# Patient Record
Sex: FEMALE | Race: WHITE | NOT HISPANIC OR LATINO
[De-identification: names, ages, dates, MRNs, and addresses within clinical notes are randomized per-mention and may not be internally consistent; named-entity substitution may affect disease eponyms.]

---

## 2018-03-28 ENCOUNTER — APPOINTMENT (OUTPATIENT)
Dept: OBGYN | Facility: CLINIC | Age: 23
End: 2018-03-28
Payer: COMMERCIAL

## 2018-03-28 VITALS
BODY MASS INDEX: 25.52 KG/M2 | HEIGHT: 60 IN | SYSTOLIC BLOOD PRESSURE: 125 MMHG | WEIGHT: 130 LBS | DIASTOLIC BLOOD PRESSURE: 83 MMHG

## 2018-03-28 DIAGNOSIS — Z86.69 PERSONAL HISTORY OF OTHER DISEASES OF THE NERVOUS SYSTEM AND SENSE ORGANS: ICD-10-CM

## 2018-03-28 DIAGNOSIS — Z87.39 PERSONAL HISTORY OF OTHER DISEASES OF THE MUSCULOSKELETAL SYSTEM AND CONNECTIVE TISSUE: ICD-10-CM

## 2018-03-28 PROCEDURE — 99385 PREV VISIT NEW AGE 18-39: CPT

## 2018-03-28 RX ORDER — TOPIRAMATE 50 MG/1
TABLET, COATED ORAL
Refills: 0 | Status: ACTIVE | COMMUNITY

## 2018-03-30 LAB
C TRACH RRNA SPEC QL NAA+PROBE: NOT DETECTED
N GONORRHOEA RRNA SPEC QL NAA+PROBE: NOT DETECTED
SOURCE TP AMPLIFICATION: NORMAL

## 2018-04-03 LAB — CYTOLOGY CVX/VAG DOC THIN PREP: NORMAL

## 2018-11-09 ENCOUNTER — MEDICATION RENEWAL (OUTPATIENT)
Age: 23
End: 2018-11-09

## 2018-11-11 RX ORDER — NORETHINDRONE AND ETHINYL ESTRADIOL TABLETS 0.4-0.035
0.4-35 KIT ORAL
Qty: 84 | Refills: 3 | Status: ACTIVE | COMMUNITY
Start: 2018-03-28

## 2019-05-08 ENCOUNTER — APPOINTMENT (OUTPATIENT)
Dept: OBGYN | Facility: CLINIC | Age: 24
End: 2019-05-08
Payer: COMMERCIAL

## 2019-05-08 VITALS
DIASTOLIC BLOOD PRESSURE: 74 MMHG | WEIGHT: 130 LBS | HEIGHT: 60 IN | SYSTOLIC BLOOD PRESSURE: 113 MMHG | BODY MASS INDEX: 25.52 KG/M2

## 2019-05-08 PROCEDURE — 99395 PREV VISIT EST AGE 18-39: CPT

## 2019-05-08 NOTE — HISTORY OF PRESENT ILLNESS
[1 Year Ago] : 1 year ago [Regular Exercise] : regular exercise [Good] : being in good health [Healthy Diet] : a healthy diet [Menstrual Problems] : reports normal menses [Weight Concerns] : no concerns with her weight [Up to Date] : up to date with ~his/her~ STD screening [Sexually Active] : is sexually active

## 2019-05-08 NOTE — PHYSICAL EXAM
[Awake] : awake [Acute Distress] : no acute distress [Alert] : alert [Mass] : no breast mass [Nipple Discharge] : no nipple discharge [Axillary LAD] : no axillary lymphadenopathy [Tender] : non tender [Soft] : soft [Oriented x3] : oriented to person, place, and time [No Bleeding] : there was no active vaginal bleeding [Normal] : uterus [Uterine Adnexae] : were not tender and not enlarged

## 2019-07-18 ENCOUNTER — OTHER (OUTPATIENT)
Age: 24
End: 2019-07-18

## 2019-09-16 ENCOUNTER — RX RENEWAL (OUTPATIENT)
Age: 24
End: 2019-09-16

## 2019-11-18 ENCOUNTER — RX RENEWAL (OUTPATIENT)
Age: 24
End: 2019-11-18

## 2019-11-21 ENCOUNTER — MEDICATION RENEWAL (OUTPATIENT)
Age: 24
End: 2019-11-21

## 2019-11-25 ENCOUNTER — MEDICATION RENEWAL (OUTPATIENT)
Age: 24
End: 2019-11-25

## 2019-11-25 RX ORDER — NORETHINDRONE AND ETHINYL ESTRADIOL TABLETS 0.4-0.035
0.4-35 KIT ORAL DAILY
Qty: 3 | Refills: 1 | Status: ACTIVE | COMMUNITY
Start: 2019-05-08

## 2019-12-05 ENCOUNTER — APPOINTMENT (OUTPATIENT)
Dept: OBGYN | Facility: CLINIC | Age: 24
End: 2019-12-05
Payer: COMMERCIAL

## 2019-12-05 VITALS
WEIGHT: 135 LBS | BODY MASS INDEX: 26.5 KG/M2 | SYSTOLIC BLOOD PRESSURE: 124 MMHG | HEIGHT: 60 IN | DIASTOLIC BLOOD PRESSURE: 72 MMHG

## 2019-12-05 DIAGNOSIS — N94.10 UNSPECIFIED DYSPAREUNIA: ICD-10-CM

## 2019-12-05 PROCEDURE — 99213 OFFICE O/P EST LOW 20 MIN: CPT

## 2019-12-22 ENCOUNTER — TRANSCRIPTION ENCOUNTER (OUTPATIENT)
Age: 24
End: 2019-12-22

## 2020-02-06 ENCOUNTER — RX RENEWAL (OUTPATIENT)
Age: 25
End: 2020-02-06

## 2020-02-06 RX ORDER — NORETHINDRONE ACETATE AND ETHINYL ESTRADIOL 1; 20 MG/1; UG/1
1-20 TABLET ORAL
Qty: 84 | Refills: 0 | Status: ACTIVE | COMMUNITY
Start: 2019-09-16 | End: 1900-01-01

## 2020-05-22 ENCOUNTER — RX RENEWAL (OUTPATIENT)
Age: 25
End: 2020-05-22

## 2020-05-22 RX ORDER — NORETHINDRONE AND ETHINYL ESTRADIOL 0.4-0.035
0.4-35 KIT ORAL
Qty: 84 | Refills: 0 | Status: ACTIVE | COMMUNITY
Start: 2019-12-05 | End: 1900-01-01

## 2020-10-20 ENCOUNTER — NON-APPOINTMENT (OUTPATIENT)
Age: 25
End: 2020-10-20

## 2020-11-05 ENCOUNTER — ASOB RESULT (OUTPATIENT)
Age: 25
End: 2020-11-05

## 2020-11-05 ENCOUNTER — APPOINTMENT (OUTPATIENT)
Dept: OBGYN | Facility: CLINIC | Age: 25
End: 2020-11-05
Payer: COMMERCIAL

## 2020-11-05 PROCEDURE — 76817 TRANSVAGINAL US OBSTETRIC: CPT

## 2020-11-05 PROCEDURE — 99072 ADDL SUPL MATRL&STAF TM PHE: CPT

## 2020-11-18 ENCOUNTER — APPOINTMENT (OUTPATIENT)
Dept: OBGYN | Facility: CLINIC | Age: 25
End: 2020-11-18
Payer: COMMERCIAL

## 2020-11-18 VITALS
HEIGHT: 60 IN | BODY MASS INDEX: 31.61 KG/M2 | SYSTOLIC BLOOD PRESSURE: 117 MMHG | WEIGHT: 161 LBS | DIASTOLIC BLOOD PRESSURE: 80 MMHG

## 2020-11-18 DIAGNOSIS — Z00.00 ENCOUNTER FOR GENERAL ADULT MEDICAL EXAMINATION W/OUT ABNORMAL FINDINGS: ICD-10-CM

## 2020-11-18 DIAGNOSIS — R11.0 NAUSEA: ICD-10-CM

## 2020-11-18 PROCEDURE — 99395 PREV VISIT EST AGE 18-39: CPT

## 2020-11-18 PROCEDURE — 36415 COLL VENOUS BLD VENIPUNCTURE: CPT

## 2020-11-18 RX ORDER — DOXYLAMINE SUCCINATE AND PYRIDOXINE HYDROCHLORIDE 10; 10 MG/1; MG/1
10-10 TABLET, DELAYED RELEASE ORAL
Qty: 28 | Refills: 4 | Status: ACTIVE | COMMUNITY
Start: 2020-11-18 | End: 1900-01-01

## 2020-11-18 NOTE — HISTORY OF PRESENT ILLNESS
[FreeTextEntry1] : pt is a 26 y/o p0 lmp 9/10 presents for annual gyn visit with +ucg at home  [Yes] : Patient has concerns regarding sex

## 2020-11-19 LAB
HIV1+2 AB SPEC QL IA.RAPID: NONREACTIVE
TSH SERPL-ACNC: 0.7 UIU/ML

## 2020-11-20 LAB
ABO + RH PNL BLD: NORMAL
BASOPHILS # BLD AUTO: 0.02 K/UL
BASOPHILS NFR BLD AUTO: 0.2 %
BLD GP AB SCN SERPL QL: NORMAL
C TRACH RRNA SPEC QL NAA+PROBE: NOT DETECTED
EOSINOPHIL # BLD AUTO: 0.1 K/UL
EOSINOPHIL NFR BLD AUTO: 0.9 %
HBV SURFACE AG SER QL: NONREACTIVE
HCT VFR BLD CALC: 44 %
HCV AB SER QL: NONREACTIVE
HCV S/CO RATIO: 0.09 S/CO
HGB A MFR BLD: 97.2 %
HGB A2 MFR BLD: 2.8 %
HGB BLD-MCNC: 14.1 G/DL
HGB FRACT BLD-IMP: NORMAL
HPV HIGH+LOW RISK DNA PNL CVX: NOT DETECTED
IMM GRANULOCYTES NFR BLD AUTO: 0.2 %
LEAD BLD-MCNC: <1 UG/DL
LYMPHOCYTES # BLD AUTO: 3.84 K/UL
LYMPHOCYTES NFR BLD AUTO: 36.4 %
MAN DIFF?: NORMAL
MCHC RBC-ENTMCNC: 28.4 PG
MCHC RBC-ENTMCNC: 32 GM/DL
MCV RBC AUTO: 88.7 FL
MEV IGG FLD QL IA: 161 AU/ML
MEV IGG+IGM SER-IMP: POSITIVE
MONOCYTES # BLD AUTO: 1.14 K/UL
MONOCYTES NFR BLD AUTO: 10.8 %
N GONORRHOEA RRNA SPEC QL NAA+PROBE: NOT DETECTED
NEUTROPHILS # BLD AUTO: 5.43 K/UL
NEUTROPHILS NFR BLD AUTO: 51.5 %
PLATELET # BLD AUTO: 383 K/UL
RBC # BLD: 4.96 M/UL
RBC # FLD: 13 %
RUBV IGG FLD-ACNC: 3.4 INDEX
RUBV IGG SER-IMP: POSITIVE
SOURCE TP AMPLIFICATION: NORMAL
T PALLIDUM AB SER QL IA: NEGATIVE
VZV AB TITR SER: POSITIVE
VZV IGG SER IF-ACNC: 2623 INDEX
WBC # FLD AUTO: 10.55 K/UL

## 2020-11-23 LAB
B19V IGG SER QL IA: 6.64 INDEX
B19V IGG+IGM SER-IMP: NORMAL
B19V IGG+IGM SER-IMP: POSITIVE
B19V IGM FLD-ACNC: 0.45 INDEX
B19V IGM SER-ACNC: NEGATIVE
CYTOLOGY CVX/VAG DOC THIN PREP: ABNORMAL
MEV IGM SER QL: NEGATIVE

## 2020-11-25 ENCOUNTER — NON-APPOINTMENT (OUTPATIENT)
Age: 25
End: 2020-11-25

## 2020-11-25 LAB
AR GENE MUT ANL BLD/T: NORMAL
FMR1 GENE MUT ANL BLD/T: NORMAL

## 2020-12-02 ENCOUNTER — LABORATORY RESULT (OUTPATIENT)
Age: 25
End: 2020-12-02

## 2020-12-03 ENCOUNTER — NON-APPOINTMENT (OUTPATIENT)
Age: 25
End: 2020-12-03

## 2020-12-03 ENCOUNTER — ASOB RESULT (OUTPATIENT)
Age: 25
End: 2020-12-03

## 2020-12-03 ENCOUNTER — APPOINTMENT (OUTPATIENT)
Dept: ANTEPARTUM | Facility: CLINIC | Age: 25
End: 2020-12-03
Payer: COMMERCIAL

## 2020-12-03 ENCOUNTER — APPOINTMENT (OUTPATIENT)
Dept: OBGYN | Facility: CLINIC | Age: 25
End: 2020-12-03
Payer: COMMERCIAL

## 2020-12-03 VITALS — WEIGHT: 163 LBS | SYSTOLIC BLOOD PRESSURE: 115 MMHG | BODY MASS INDEX: 31.83 KG/M2 | DIASTOLIC BLOOD PRESSURE: 78 MMHG

## 2020-12-03 PROCEDURE — 99072 ADDL SUPL MATRL&STAF TM PHE: CPT

## 2020-12-03 PROCEDURE — 0502F SUBSEQUENT PRENATAL CARE: CPT

## 2020-12-03 PROCEDURE — 76813 OB US NUCHAL MEAS 1 GEST: CPT

## 2020-12-03 PROCEDURE — 36416 COLLJ CAPILLARY BLOOD SPEC: CPT

## 2020-12-03 PROCEDURE — 76801 OB US < 14 WKS SINGLE FETUS: CPT

## 2020-12-04 LAB — CFTR MUT TESTED BLD/T: NEGATIVE

## 2020-12-08 ENCOUNTER — NON-APPOINTMENT (OUTPATIENT)
Age: 25
End: 2020-12-08

## 2020-12-08 DIAGNOSIS — Z3A.12 12 WEEKS GESTATION OF PREGNANCY: ICD-10-CM

## 2020-12-08 DIAGNOSIS — Q89.1 CONGENITAL MALFORMATIONS OF ADRENAL GLAND: ICD-10-CM

## 2020-12-09 ENCOUNTER — NON-APPOINTMENT (OUTPATIENT)
Age: 25
End: 2020-12-09

## 2020-12-24 ENCOUNTER — TRANSCRIPTION ENCOUNTER (OUTPATIENT)
Age: 25
End: 2020-12-24

## 2020-12-24 ENCOUNTER — ASOB RESULT (OUTPATIENT)
Age: 25
End: 2020-12-24

## 2020-12-24 ENCOUNTER — APPOINTMENT (OUTPATIENT)
Dept: ANTEPARTUM | Facility: CLINIC | Age: 25
End: 2020-12-24
Payer: COMMERCIAL

## 2020-12-24 PROCEDURE — 99215 OFFICE O/P EST HI 40 MIN: CPT | Mod: 95

## 2020-12-24 PROCEDURE — 99205 OFFICE O/P NEW HI 60 MIN: CPT | Mod: 95

## 2021-01-06 ENCOUNTER — LABORATORY RESULT (OUTPATIENT)
Age: 26
End: 2021-01-06

## 2021-01-07 ENCOUNTER — NON-APPOINTMENT (OUTPATIENT)
Age: 26
End: 2021-01-07

## 2021-01-07 ENCOUNTER — APPOINTMENT (OUTPATIENT)
Dept: OBGYN | Facility: CLINIC | Age: 26
End: 2021-01-07
Payer: COMMERCIAL

## 2021-01-07 VITALS
DIASTOLIC BLOOD PRESSURE: 69 MMHG | HEIGHT: 60 IN | SYSTOLIC BLOOD PRESSURE: 104 MMHG | BODY MASS INDEX: 32.98 KG/M2 | WEIGHT: 168 LBS

## 2021-01-07 PROCEDURE — 36415 COLL VENOUS BLD VENIPUNCTURE: CPT

## 2021-01-07 PROCEDURE — 0502F SUBSEQUENT PRENATAL CARE: CPT

## 2021-01-15 ENCOUNTER — NON-APPOINTMENT (OUTPATIENT)
Age: 26
End: 2021-01-15

## 2021-02-02 ENCOUNTER — APPOINTMENT (OUTPATIENT)
Dept: OBGYN | Facility: CLINIC | Age: 26
End: 2021-02-02
Payer: COMMERCIAL

## 2021-02-02 ENCOUNTER — NON-APPOINTMENT (OUTPATIENT)
Age: 26
End: 2021-02-02

## 2021-02-02 VITALS
WEIGHT: 169 LBS | HEIGHT: 60 IN | BODY MASS INDEX: 33.18 KG/M2 | SYSTOLIC BLOOD PRESSURE: 120 MMHG | DIASTOLIC BLOOD PRESSURE: 77 MMHG

## 2021-02-02 PROCEDURE — 0502F SUBSEQUENT PRENATAL CARE: CPT

## 2021-02-05 ENCOUNTER — ASOB RESULT (OUTPATIENT)
Age: 26
End: 2021-02-05

## 2021-02-05 ENCOUNTER — APPOINTMENT (OUTPATIENT)
Dept: ANTEPARTUM | Facility: CLINIC | Age: 26
End: 2021-02-05
Payer: COMMERCIAL

## 2021-02-05 PROCEDURE — 99072 ADDL SUPL MATRL&STAF TM PHE: CPT

## 2021-02-05 PROCEDURE — 76805 OB US >/= 14 WKS SNGL FETUS: CPT

## 2021-02-10 ENCOUNTER — NON-APPOINTMENT (OUTPATIENT)
Age: 26
End: 2021-02-10

## 2021-02-19 ENCOUNTER — ASOB RESULT (OUTPATIENT)
Age: 26
End: 2021-02-19

## 2021-02-19 ENCOUNTER — APPOINTMENT (OUTPATIENT)
Dept: ANTEPARTUM | Facility: CLINIC | Age: 26
End: 2021-02-19
Payer: COMMERCIAL

## 2021-02-19 PROCEDURE — 76816 OB US FOLLOW-UP PER FETUS: CPT

## 2021-02-19 PROCEDURE — 99072 ADDL SUPL MATRL&STAF TM PHE: CPT

## 2021-02-25 ENCOUNTER — APPOINTMENT (OUTPATIENT)
Dept: OBGYN | Facility: CLINIC | Age: 26
End: 2021-02-25
Payer: COMMERCIAL

## 2021-02-25 ENCOUNTER — NON-APPOINTMENT (OUTPATIENT)
Age: 26
End: 2021-02-25

## 2021-02-25 VITALS
HEIGHT: 60 IN | WEIGHT: 173 LBS | BODY MASS INDEX: 33.96 KG/M2 | DIASTOLIC BLOOD PRESSURE: 77 MMHG | SYSTOLIC BLOOD PRESSURE: 114 MMHG

## 2021-02-25 DIAGNOSIS — R39.15 URGENCY OF URINATION: ICD-10-CM

## 2021-02-25 DIAGNOSIS — Z20.822 CONTACT WITH AND (SUSPECTED) EXPOSURE TO COVID-19: ICD-10-CM

## 2021-02-25 PROCEDURE — 0502F SUBSEQUENT PRENATAL CARE: CPT

## 2021-02-25 PROCEDURE — 36415 COLL VENOUS BLD VENIPUNCTURE: CPT

## 2021-02-26 LAB
BASOPHILS # BLD AUTO: 0.02 K/UL
BASOPHILS NFR BLD AUTO: 0.2 %
EOSINOPHIL # BLD AUTO: 0.08 K/UL
EOSINOPHIL NFR BLD AUTO: 0.8 %
HCT VFR BLD CALC: 38.2 %
HGB BLD-MCNC: 12.3 G/DL
IMM GRANULOCYTES NFR BLD AUTO: 1.8 %
LYMPHOCYTES # BLD AUTO: 2.68 K/UL
LYMPHOCYTES NFR BLD AUTO: 25.2 %
MAN DIFF?: NORMAL
MCHC RBC-ENTMCNC: 30.3 PG
MCHC RBC-ENTMCNC: 32.2 GM/DL
MCV RBC AUTO: 94.1 FL
MONOCYTES # BLD AUTO: 1 K/UL
MONOCYTES NFR BLD AUTO: 9.4 %
NEUTROPHILS # BLD AUTO: 6.66 K/UL
NEUTROPHILS NFR BLD AUTO: 62.6 %
PLATELET # BLD AUTO: 356 K/UL
RBC # BLD: 4.06 M/UL
RBC # FLD: 13.2 %
SARS-COV-2 IGG SERPL IA-ACNC: 0.07 INDEX
SARS-COV-2 IGG SERPL QL IA: NEGATIVE
WBC # FLD AUTO: 10.63 K/UL

## 2021-03-01 LAB
BACTERIA UR CULT: NORMAL
GLUCOSE 1H P 50 G GLC PO SERPL-MCNC: 94 MG/DL

## 2021-03-25 ENCOUNTER — APPOINTMENT (OUTPATIENT)
Dept: OBGYN | Facility: CLINIC | Age: 26
End: 2021-03-25

## 2021-03-30 ENCOUNTER — RX RENEWAL (OUTPATIENT)
Age: 26
End: 2021-03-30

## 2021-04-08 ENCOUNTER — NON-APPOINTMENT (OUTPATIENT)
Age: 26
End: 2021-04-08

## 2021-04-08 ENCOUNTER — APPOINTMENT (OUTPATIENT)
Dept: ANTEPARTUM | Facility: CLINIC | Age: 26
End: 2021-04-08
Payer: COMMERCIAL

## 2021-04-08 ENCOUNTER — APPOINTMENT (OUTPATIENT)
Dept: OBGYN | Facility: CLINIC | Age: 26
End: 2021-04-08
Payer: COMMERCIAL

## 2021-04-08 ENCOUNTER — ASOB RESULT (OUTPATIENT)
Age: 26
End: 2021-04-08

## 2021-04-08 VITALS
HEIGHT: 60 IN | SYSTOLIC BLOOD PRESSURE: 114 MMHG | DIASTOLIC BLOOD PRESSURE: 76 MMHG | WEIGHT: 179 LBS | BODY MASS INDEX: 35.14 KG/M2

## 2021-04-08 DIAGNOSIS — Z23 ENCOUNTER FOR IMMUNIZATION: ICD-10-CM

## 2021-04-08 PROCEDURE — 99072 ADDL SUPL MATRL&STAF TM PHE: CPT

## 2021-04-08 PROCEDURE — 76819 FETAL BIOPHYS PROFIL W/O NST: CPT

## 2021-04-08 PROCEDURE — 90471 IMMUNIZATION ADMIN: CPT

## 2021-04-08 PROCEDURE — 76816 OB US FOLLOW-UP PER FETUS: CPT

## 2021-04-08 PROCEDURE — 90715 TDAP VACCINE 7 YRS/> IM: CPT

## 2021-04-27 ENCOUNTER — NON-APPOINTMENT (OUTPATIENT)
Age: 26
End: 2021-04-27

## 2021-04-27 ENCOUNTER — APPOINTMENT (OUTPATIENT)
Dept: OBGYN | Facility: CLINIC | Age: 26
End: 2021-04-27
Payer: COMMERCIAL

## 2021-04-27 VITALS
WEIGHT: 184 LBS | DIASTOLIC BLOOD PRESSURE: 71 MMHG | HEIGHT: 60 IN | SYSTOLIC BLOOD PRESSURE: 107 MMHG | BODY MASS INDEX: 36.12 KG/M2

## 2021-04-27 PROCEDURE — 0502F SUBSEQUENT PRENATAL CARE: CPT

## 2021-04-29 ENCOUNTER — APPOINTMENT (OUTPATIENT)
Dept: OBGYN | Facility: CLINIC | Age: 26
End: 2021-04-29

## 2021-05-13 ENCOUNTER — APPOINTMENT (OUTPATIENT)
Dept: OBGYN | Facility: CLINIC | Age: 26
End: 2021-05-13
Payer: COMMERCIAL

## 2021-05-13 ENCOUNTER — NON-APPOINTMENT (OUTPATIENT)
Age: 26
End: 2021-05-13

## 2021-05-13 VITALS
SYSTOLIC BLOOD PRESSURE: 108 MMHG | DIASTOLIC BLOOD PRESSURE: 75 MMHG | BODY MASS INDEX: 35.14 KG/M2 | HEIGHT: 60 IN | WEIGHT: 179 LBS

## 2021-05-13 PROCEDURE — 0502F SUBSEQUENT PRENATAL CARE: CPT

## 2021-05-27 ENCOUNTER — NON-APPOINTMENT (OUTPATIENT)
Age: 26
End: 2021-05-27

## 2021-05-27 ENCOUNTER — APPOINTMENT (OUTPATIENT)
Dept: OBGYN | Facility: CLINIC | Age: 26
End: 2021-05-27
Payer: COMMERCIAL

## 2021-05-27 ENCOUNTER — APPOINTMENT (OUTPATIENT)
Dept: ANTEPARTUM | Facility: CLINIC | Age: 26
End: 2021-05-27
Payer: COMMERCIAL

## 2021-05-27 ENCOUNTER — ASOB RESULT (OUTPATIENT)
Age: 26
End: 2021-05-27

## 2021-05-27 VITALS
SYSTOLIC BLOOD PRESSURE: 118 MMHG | DIASTOLIC BLOOD PRESSURE: 78 MMHG | WEIGHT: 181 LBS | BODY MASS INDEX: 35.53 KG/M2 | HEIGHT: 60 IN

## 2021-05-27 PROCEDURE — 0502F SUBSEQUENT PRENATAL CARE: CPT

## 2021-05-27 PROCEDURE — 99072 ADDL SUPL MATRL&STAF TM PHE: CPT

## 2021-05-27 PROCEDURE — 76816 OB US FOLLOW-UP PER FETUS: CPT

## 2021-05-27 PROCEDURE — 76819 FETAL BIOPHYS PROFIL W/O NST: CPT

## 2021-06-02 LAB — B-HEM STREP SPEC QL CULT: ABNORMAL

## 2021-06-03 ENCOUNTER — NON-APPOINTMENT (OUTPATIENT)
Age: 26
End: 2021-06-03

## 2021-06-03 ENCOUNTER — APPOINTMENT (OUTPATIENT)
Dept: OBGYN | Facility: CLINIC | Age: 26
End: 2021-06-03
Payer: COMMERCIAL

## 2021-06-03 VITALS
SYSTOLIC BLOOD PRESSURE: 119 MMHG | DIASTOLIC BLOOD PRESSURE: 77 MMHG | BODY MASS INDEX: 33.49 KG/M2 | WEIGHT: 182 LBS | HEIGHT: 62 IN

## 2021-06-03 PROCEDURE — 0502F SUBSEQUENT PRENATAL CARE: CPT

## 2021-06-11 ENCOUNTER — NON-APPOINTMENT (OUTPATIENT)
Age: 26
End: 2021-06-11

## 2021-06-11 ENCOUNTER — APPOINTMENT (OUTPATIENT)
Dept: OBGYN | Facility: CLINIC | Age: 26
End: 2021-06-11
Payer: COMMERCIAL

## 2021-06-11 VITALS
WEIGHT: 183 LBS | HEIGHT: 62 IN | SYSTOLIC BLOOD PRESSURE: 110 MMHG | DIASTOLIC BLOOD PRESSURE: 70 MMHG | BODY MASS INDEX: 33.68 KG/M2

## 2021-06-11 DIAGNOSIS — Z3A.39 39 WEEKS GESTATION OF PREGNANCY: ICD-10-CM

## 2021-06-11 PROCEDURE — 0502F SUBSEQUENT PRENATAL CARE: CPT

## 2021-06-15 ENCOUNTER — INPATIENT (INPATIENT)
Facility: HOSPITAL | Age: 26
LOS: 2 days | Discharge: HOME | End: 2021-06-18
Attending: OBSTETRICS & GYNECOLOGY | Admitting: OBSTETRICS & GYNECOLOGY
Payer: COMMERCIAL

## 2021-06-15 ENCOUNTER — NON-APPOINTMENT (OUTPATIENT)
Age: 26
End: 2021-06-15

## 2021-06-15 VITALS
HEART RATE: 91 BPM | RESPIRATION RATE: 16 BRPM | DIASTOLIC BLOOD PRESSURE: 61 MMHG | TEMPERATURE: 99 F | SYSTOLIC BLOOD PRESSURE: 128 MMHG

## 2021-06-15 DIAGNOSIS — O26.899 OTHER SPECIFIED PREGNANCY RELATED CONDITIONS, UNSPECIFIED TRIMESTER: ICD-10-CM

## 2021-06-15 DIAGNOSIS — Z3A.00 WEEKS OF GESTATION OF PREGNANCY NOT SPECIFIED: ICD-10-CM

## 2021-06-15 RX ORDER — VANCOMYCIN HCL 1 G
VIAL (EA) INTRAVENOUS
Refills: 0 | Status: DISCONTINUED | OUTPATIENT
Start: 2021-06-15 | End: 2021-06-16

## 2021-06-15 RX ORDER — SODIUM CHLORIDE 9 MG/ML
1000 INJECTION, SOLUTION INTRAVENOUS
Refills: 0 | Status: DISCONTINUED | OUTPATIENT
Start: 2021-06-15 | End: 2021-06-17

## 2021-06-15 RX ORDER — SODIUM CHLORIDE 9 MG/ML
1000 INJECTION, SOLUTION INTRAVENOUS ONCE
Refills: 0 | Status: COMPLETED | OUTPATIENT
Start: 2021-06-15 | End: 2021-06-15

## 2021-06-15 RX ORDER — CITRIC ACID/SODIUM CITRATE 300-500 MG
15 SOLUTION, ORAL ORAL EVERY 6 HOURS
Refills: 0 | Status: DISCONTINUED | OUTPATIENT
Start: 2021-06-15 | End: 2021-06-17

## 2021-06-15 RX ORDER — OXYTOCIN 10 UNIT/ML
333.33 VIAL (ML) INJECTION
Qty: 20 | Refills: 0 | Status: DISCONTINUED | OUTPATIENT
Start: 2021-06-15 | End: 2021-06-17

## 2021-06-15 RX ADMIN — SODIUM CHLORIDE 125 MILLILITER(S): 9 INJECTION, SOLUTION INTRAVENOUS at 23:45

## 2021-06-15 NOTE — OB PROVIDER TRIAGE NOTE - NS_OBGYNHISTORY_OBGYN_ALL_OB_FT
GYN: Denies  OB: Denies      AP course uncomplicated  -GBS positive  -Needs Hydrocortisone during labor as per Endocrine Note

## 2021-06-15 NOTE — OB PROVIDER TRIAGE NOTE - NSHPPHYSICALEXAM_GEN_ALL_CORE
Vital Signs Last 24 Hrs  T(C): 37.1 (15 Byron 2021 22:41), Max: 37.1 (15 Byron 2021 22:39)  T(F): 98.78 (15 Byron 2021 22:41), Max: 98.8 (15 Byron 2021 22:39)  HR: 91 (15 Byron 2021 22:42) (91 - 91)  BP: 128/61 (15 Byron 2021 22:42) (128/61 - 128/61)  RR: 16 (15 Byron 2021 22:39) (16 - 16)      Assessment reveals VSS  Abdomen soft, NT, gravid  Cat 1 tracing, occasional   Transabdominal Ultrasound- vtx  Vaginal Exam- 0.5/40/-3  A&Ox3  Lungs- clear bilateral  Heart- normal rate and rhythm      PLAN: Admit for Cat 2

## 2021-06-15 NOTE — OB PROVIDER TRIAGE NOTE - HISTORY OF PRESENT ILLNESS
25y/o  @39.5wks presents with "feeling smaller movements" since this morning.   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

## 2021-06-16 ENCOUNTER — TRANSCRIPTION ENCOUNTER (OUTPATIENT)
Age: 26
End: 2021-06-16

## 2021-06-16 ENCOUNTER — APPOINTMENT (OUTPATIENT)
Dept: OBGYN | Facility: CLINIC | Age: 26
End: 2021-06-16

## 2021-06-16 LAB
BASOPHILS # BLD AUTO: 0.04 K/UL — SIGNIFICANT CHANGE UP (ref 0–0.2)
BASOPHILS NFR BLD AUTO: 0.3 % — SIGNIFICANT CHANGE UP (ref 0–2)
BLD GP AB SCN SERPL QL: NEGATIVE — SIGNIFICANT CHANGE UP
COVID-19 SPIKE DOMAIN AB INTERP: POSITIVE
COVID-19 SPIKE DOMAIN ANTIBODY RESULT: 216 U/ML — HIGH
EOSINOPHIL # BLD AUTO: 0.1 K/UL — SIGNIFICANT CHANGE UP (ref 0–0.5)
EOSINOPHIL NFR BLD AUTO: 0.8 % — SIGNIFICANT CHANGE UP (ref 0–6)
HCT VFR BLD CALC: 37.8 % — SIGNIFICANT CHANGE UP (ref 34.5–45)
HGB BLD-MCNC: 12.3 G/DL — SIGNIFICANT CHANGE UP (ref 11.5–15.5)
IANC: 8.12 K/UL — SIGNIFICANT CHANGE UP (ref 1.5–8.5)
IMM GRANULOCYTES NFR BLD AUTO: 1.2 % — SIGNIFICANT CHANGE UP (ref 0–1.5)
LYMPHOCYTES # BLD AUTO: 2.86 K/UL — SIGNIFICANT CHANGE UP (ref 1–3.3)
LYMPHOCYTES # BLD AUTO: 22.9 % — SIGNIFICANT CHANGE UP (ref 13–44)
MCHC RBC-ENTMCNC: 28.9 PG — SIGNIFICANT CHANGE UP (ref 27–34)
MCHC RBC-ENTMCNC: 32.5 GM/DL — SIGNIFICANT CHANGE UP (ref 32–36)
MCV RBC AUTO: 88.7 FL — SIGNIFICANT CHANGE UP (ref 80–100)
MONOCYTES # BLD AUTO: 1.23 K/UL — HIGH (ref 0–0.9)
MONOCYTES NFR BLD AUTO: 9.8 % — SIGNIFICANT CHANGE UP (ref 2–14)
NEUTROPHILS # BLD AUTO: 8.12 K/UL — HIGH (ref 1.8–7.4)
NEUTROPHILS NFR BLD AUTO: 65 % — SIGNIFICANT CHANGE UP (ref 43–77)
NRBC # BLD: 0 /100 WBCS — SIGNIFICANT CHANGE UP
NRBC # FLD: 0 K/UL — SIGNIFICANT CHANGE UP
PLATELET # BLD AUTO: 291 K/UL — SIGNIFICANT CHANGE UP (ref 150–400)
RBC # BLD: 4.26 M/UL — SIGNIFICANT CHANGE UP (ref 3.8–5.2)
RBC # FLD: 13.6 % — SIGNIFICANT CHANGE UP (ref 10.3–14.5)
RH IG SCN BLD-IMP: POSITIVE — SIGNIFICANT CHANGE UP
RH IG SCN BLD-IMP: POSITIVE — SIGNIFICANT CHANGE UP
SARS-COV-2 IGG+IGM SERPL QL IA: 216 U/ML — HIGH
SARS-COV-2 IGG+IGM SERPL QL IA: POSITIVE
SARS-COV-2 RNA SPEC QL NAA+PROBE: SIGNIFICANT CHANGE UP
T PALLIDUM AB TITR SER: NEGATIVE — SIGNIFICANT CHANGE UP
WBC # BLD: 12.5 K/UL — HIGH (ref 3.8–10.5)
WBC # FLD AUTO: 12.5 K/UL — HIGH (ref 3.8–10.5)

## 2021-06-16 RX ORDER — OXYTOCIN 10 UNIT/ML
2 VIAL (ML) INJECTION
Qty: 30 | Refills: 0 | Status: DISCONTINUED | OUTPATIENT
Start: 2021-06-16 | End: 2021-06-17

## 2021-06-16 RX ORDER — HYDROCORTISONE 20 MG
5 TABLET ORAL ONCE
Refills: 0 | Status: COMPLETED | OUTPATIENT
Start: 2021-06-16 | End: 2021-06-16

## 2021-06-16 RX ORDER — HYDROCORTISONE 20 MG
10 TABLET ORAL ONCE
Refills: 0 | Status: COMPLETED | OUTPATIENT
Start: 2021-06-16 | End: 2021-06-16

## 2021-06-16 RX ORDER — VANCOMYCIN HCL 1 G
1500 VIAL (EA) INTRAVENOUS EVERY 12 HOURS
Refills: 0 | Status: DISCONTINUED | OUTPATIENT
Start: 2021-06-16 | End: 2021-06-17

## 2021-06-16 RX ORDER — FLUOXETINE HCL 10 MG
10 CAPSULE ORAL DAILY
Refills: 0 | Status: DISCONTINUED | OUTPATIENT
Start: 2021-06-16 | End: 2021-06-18

## 2021-06-16 RX ORDER — HYDROCORTISONE 20 MG
100 TABLET ORAL ONCE
Refills: 0 | Status: DISCONTINUED | OUTPATIENT
Start: 2021-06-16 | End: 2021-06-16

## 2021-06-16 RX ORDER — HYDROCORTISONE 20 MG
100 TABLET ORAL ONCE
Refills: 0 | Status: COMPLETED | OUTPATIENT
Start: 2021-06-16 | End: 2021-06-17

## 2021-06-16 RX ORDER — VANCOMYCIN HCL 1 G
1500 VIAL (EA) INTRAVENOUS ONCE
Refills: 0 | Status: COMPLETED | OUTPATIENT
Start: 2021-06-16 | End: 2021-06-16

## 2021-06-16 RX ORDER — VANCOMYCIN HCL 1 G
VIAL (EA) INTRAVENOUS
Refills: 0 | Status: DISCONTINUED | OUTPATIENT
Start: 2021-06-16 | End: 2021-06-17

## 2021-06-16 RX ORDER — DIPHENHYDRAMINE HCL 50 MG
25 CAPSULE ORAL EVERY 6 HOURS
Refills: 0 | Status: DISCONTINUED | OUTPATIENT
Start: 2021-06-16 | End: 2021-06-17

## 2021-06-16 RX ORDER — HYDROCORTISONE 20 MG
25 TABLET ORAL EVERY 6 HOURS
Refills: 0 | Status: COMPLETED | OUTPATIENT
Start: 2021-06-16 | End: 2022-05-15

## 2021-06-16 RX ORDER — HYDROCORTISONE 20 MG
25 TABLET ORAL EVERY 6 HOURS
Refills: 0 | Status: DISCONTINUED | OUTPATIENT
Start: 2021-06-16 | End: 2021-06-17

## 2021-06-16 RX ORDER — HYDROCORTISONE 20 MG
100 TABLET ORAL ONCE
Refills: 0 | Status: DISCONTINUED | OUTPATIENT
Start: 2021-06-16 | End: 2021-06-17

## 2021-06-16 RX ORDER — FLUOXETINE HCL 10 MG
5 CAPSULE ORAL DAILY
Refills: 0 | Status: DISCONTINUED | OUTPATIENT
Start: 2021-06-16 | End: 2021-06-16

## 2021-06-16 RX ADMIN — Medication 10 MILLIGRAM(S): at 12:22

## 2021-06-16 RX ADMIN — SODIUM CHLORIDE 1000 MILLILITER(S): 9 INJECTION, SOLUTION INTRAVENOUS at 03:00

## 2021-06-16 RX ADMIN — Medication 10 MILLIGRAM(S): at 01:25

## 2021-06-16 RX ADMIN — Medication 25 MILLIGRAM(S): at 12:37

## 2021-06-16 RX ADMIN — Medication 500 MILLIGRAM(S): at 12:37

## 2021-06-16 RX ADMIN — Medication 15 MILLILITER(S): at 20:03

## 2021-06-16 RX ADMIN — SODIUM CHLORIDE 125 MILLILITER(S): 9 INJECTION, SOLUTION INTRAVENOUS at 10:40

## 2021-06-16 RX ADMIN — Medication 25 MILLIGRAM(S): at 02:07

## 2021-06-16 RX ADMIN — Medication 25 MILLIGRAM(S): at 18:45

## 2021-06-16 RX ADMIN — Medication 500 MILLIGRAM(S): at 01:09

## 2021-06-16 RX ADMIN — Medication 2 MILLIUNIT(S)/MIN: at 20:07

## 2021-06-16 RX ADMIN — Medication 15 MILLILITER(S): at 00:15

## 2021-06-16 NOTE — OB PROVIDER H&P - PROBLEM SELECTOR PLAN 1
Admit for IOL for Cat 2 tracing  D/W Dr. Willard  Routine Orders  Oral Cytotec  Pain management PRN  Vanco for GBS positive   Covid swabbed  Partner Vaccinated   Hydrocortisone to be given in Labor as per Endocrine

## 2021-06-16 NOTE — OB PROVIDER H&P - ATTENDING COMMENTS
OB Attending    P0 at term c/o decreased fetal movement. Cat II tracing  -admit to L&D  -po cytotec and cervical balloon  -low baseline per patient, wandering baseline on admission with ? decels, will review tracing with ESTHELA Willard MD

## 2021-06-16 NOTE — OB PROVIDER H&P - HISTORY OF PRESENT ILLNESS
27y/o  @39.5wks presents with "feeling smaller movements" since this morning.   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

## 2021-06-16 NOTE — OB PROVIDER LABOR PROGRESS NOTE - ASSESSMENT
Plan   sp PO/CB   will start pitocin as next induction agent   Cont EFM/[place]  anticipate     Nancy Nova MD PGY2  d/w Dr. New

## 2021-06-17 ENCOUNTER — APPOINTMENT (OUTPATIENT)
Dept: ANTEPARTUM | Facility: CLINIC | Age: 26
End: 2021-06-17

## 2021-06-17 LAB
APTT BLD: 24.8 SEC — LOW (ref 27–36.3)
BASOPHILS # BLD AUTO: 0.02 K/UL — SIGNIFICANT CHANGE UP (ref 0–0.2)
BASOPHILS NFR BLD AUTO: 0.1 % — SIGNIFICANT CHANGE UP (ref 0–2)
EOSINOPHIL # BLD AUTO: 0 K/UL — SIGNIFICANT CHANGE UP (ref 0–0.5)
EOSINOPHIL NFR BLD AUTO: 0 % — SIGNIFICANT CHANGE UP (ref 0–6)
HCT VFR BLD CALC: 28.6 % — LOW (ref 34.5–45)
HCT VFR BLD CALC: 35 % — SIGNIFICANT CHANGE UP (ref 34.5–45)
HGB BLD-MCNC: 11.3 G/DL — LOW (ref 11.5–15.5)
HGB BLD-MCNC: 9.4 G/DL — LOW (ref 11.5–15.5)
IANC: 18.12 K/UL — HIGH (ref 1.5–8.5)
IMM GRANULOCYTES NFR BLD AUTO: 0.9 % — SIGNIFICANT CHANGE UP (ref 0–1.5)
INR BLD: 0.99 RATIO — SIGNIFICANT CHANGE UP (ref 0.88–1.16)
LYMPHOCYTES # BLD AUTO: 1.62 K/UL — SIGNIFICANT CHANGE UP (ref 1–3.3)
LYMPHOCYTES # BLD AUTO: 7.7 % — LOW (ref 13–44)
MCHC RBC-ENTMCNC: 29.1 PG — SIGNIFICANT CHANGE UP (ref 27–34)
MCHC RBC-ENTMCNC: 29.2 PG — SIGNIFICANT CHANGE UP (ref 27–34)
MCHC RBC-ENTMCNC: 32.3 GM/DL — SIGNIFICANT CHANGE UP (ref 32–36)
MCHC RBC-ENTMCNC: 32.9 GM/DL — SIGNIFICANT CHANGE UP (ref 32–36)
MCV RBC AUTO: 88.8 FL — SIGNIFICANT CHANGE UP (ref 80–100)
MCV RBC AUTO: 90.2 FL — SIGNIFICANT CHANGE UP (ref 80–100)
MONOCYTES # BLD AUTO: 1.11 K/UL — HIGH (ref 0–0.9)
MONOCYTES NFR BLD AUTO: 5.3 % — SIGNIFICANT CHANGE UP (ref 2–14)
NEUTROPHILS # BLD AUTO: 18.12 K/UL — HIGH (ref 1.8–7.4)
NEUTROPHILS NFR BLD AUTO: 86 % — HIGH (ref 43–77)
NRBC # BLD: 0 /100 WBCS — SIGNIFICANT CHANGE UP
NRBC # BLD: 0 /100 WBCS — SIGNIFICANT CHANGE UP
NRBC # FLD: 0 K/UL — SIGNIFICANT CHANGE UP
NRBC # FLD: 0 K/UL — SIGNIFICANT CHANGE UP
PLATELET # BLD AUTO: 231 K/UL — SIGNIFICANT CHANGE UP (ref 150–400)
PLATELET # BLD AUTO: 255 K/UL — SIGNIFICANT CHANGE UP (ref 150–400)
PROTHROM AB SERPL-ACNC: 11.4 SEC — SIGNIFICANT CHANGE UP (ref 10.6–13.6)
RBC # BLD: 3.22 M/UL — LOW (ref 3.8–5.2)
RBC # BLD: 3.88 M/UL — SIGNIFICANT CHANGE UP (ref 3.8–5.2)
RBC # FLD: 13.4 % — SIGNIFICANT CHANGE UP (ref 10.3–14.5)
RBC # FLD: 13.6 % — SIGNIFICANT CHANGE UP (ref 10.3–14.5)
WBC # BLD: 21.05 K/UL — HIGH (ref 3.8–10.5)
WBC # BLD: 23.08 K/UL — HIGH (ref 3.8–10.5)
WBC # FLD AUTO: 21.05 K/UL — HIGH (ref 3.8–10.5)
WBC # FLD AUTO: 23.08 K/UL — HIGH (ref 3.8–10.5)

## 2021-06-17 PROCEDURE — 59400 OBSTETRICAL CARE: CPT

## 2021-06-17 RX ORDER — FLUOXETINE HCL 10 MG
1 CAPSULE ORAL
Qty: 0 | Refills: 0 | DISCHARGE
Start: 2021-06-17

## 2021-06-17 RX ORDER — MAGNESIUM HYDROXIDE 400 MG/1
30 TABLET, CHEWABLE ORAL
Refills: 0 | Status: DISCONTINUED | OUTPATIENT
Start: 2021-06-17 | End: 2021-06-18

## 2021-06-17 RX ORDER — ERTAPENEM SODIUM 1 G/1
1000 INJECTION, POWDER, LYOPHILIZED, FOR SOLUTION INTRAMUSCULAR; INTRAVENOUS ONCE
Refills: 0 | Status: COMPLETED | OUTPATIENT
Start: 2021-06-17 | End: 2021-06-17

## 2021-06-17 RX ORDER — HYDROCORTISONE 20 MG
10 TABLET ORAL DAILY
Refills: 0 | Status: DISCONTINUED | OUTPATIENT
Start: 2021-06-17 | End: 2021-06-17

## 2021-06-17 RX ORDER — DIPHENHYDRAMINE HCL 50 MG
25 CAPSULE ORAL EVERY 6 HOURS
Refills: 0 | Status: DISCONTINUED | OUTPATIENT
Start: 2021-06-17 | End: 2021-06-18

## 2021-06-17 RX ORDER — OXYCODONE HYDROCHLORIDE 5 MG/1
5 TABLET ORAL
Refills: 0 | Status: DISCONTINUED | OUTPATIENT
Start: 2021-06-17 | End: 2021-06-18

## 2021-06-17 RX ORDER — BENZOCAINE 10 %
1 GEL (GRAM) MUCOUS MEMBRANE EVERY 6 HOURS
Refills: 0 | Status: DISCONTINUED | OUTPATIENT
Start: 2021-06-17 | End: 2021-06-18

## 2021-06-17 RX ORDER — HYDROCORTISONE 20 MG
5 TABLET ORAL
Refills: 0 | Status: DISCONTINUED | OUTPATIENT
Start: 2021-06-17 | End: 2021-06-18

## 2021-06-17 RX ORDER — LANOLIN
1 OINTMENT (GRAM) TOPICAL EVERY 6 HOURS
Refills: 0 | Status: DISCONTINUED | OUTPATIENT
Start: 2021-06-17 | End: 2021-06-18

## 2021-06-17 RX ORDER — OXYTOCIN 10 UNIT/ML
333.33 VIAL (ML) INJECTION
Qty: 20 | Refills: 0 | Status: DISCONTINUED | OUTPATIENT
Start: 2021-06-17 | End: 2021-06-18

## 2021-06-17 RX ORDER — AER TRAVELER 0.5 G/1
1 SOLUTION RECTAL; TOPICAL EVERY 4 HOURS
Refills: 0 | Status: DISCONTINUED | OUTPATIENT
Start: 2021-06-17 | End: 2021-06-18

## 2021-06-17 RX ORDER — HYDROCORTISONE 20 MG
5 TABLET ORAL DAILY
Refills: 0 | Status: DISCONTINUED | OUTPATIENT
Start: 2021-06-17 | End: 2021-06-17

## 2021-06-17 RX ORDER — DIBUCAINE 1 %
1 OINTMENT (GRAM) RECTAL EVERY 6 HOURS
Refills: 0 | Status: DISCONTINUED | OUTPATIENT
Start: 2021-06-17 | End: 2021-06-18

## 2021-06-17 RX ORDER — PRAMOXINE HYDROCHLORIDE 150 MG/15G
1 AEROSOL, FOAM RECTAL EVERY 4 HOURS
Refills: 0 | Status: DISCONTINUED | OUTPATIENT
Start: 2021-06-17 | End: 2021-06-18

## 2021-06-17 RX ORDER — IBUPROFEN 200 MG
600 TABLET ORAL EVERY 6 HOURS
Refills: 0 | Status: DISCONTINUED | OUTPATIENT
Start: 2021-06-17 | End: 2021-06-18

## 2021-06-17 RX ORDER — SIMETHICONE 80 MG/1
80 TABLET, CHEWABLE ORAL EVERY 4 HOURS
Refills: 0 | Status: DISCONTINUED | OUTPATIENT
Start: 2021-06-17 | End: 2021-06-18

## 2021-06-17 RX ORDER — SODIUM CHLORIDE 9 MG/ML
3 INJECTION INTRAMUSCULAR; INTRAVENOUS; SUBCUTANEOUS EVERY 8 HOURS
Refills: 0 | Status: DISCONTINUED | OUTPATIENT
Start: 2021-06-17 | End: 2021-06-18

## 2021-06-17 RX ORDER — HYDROCORTISONE 1 %
1 OINTMENT (GRAM) TOPICAL EVERY 6 HOURS
Refills: 0 | Status: DISCONTINUED | OUTPATIENT
Start: 2021-06-17 | End: 2021-06-18

## 2021-06-17 RX ORDER — TETANUS TOXOID, REDUCED DIPHTHERIA TOXOID AND ACELLULAR PERTUSSIS VACCINE, ADSORBED 5; 2.5; 8; 8; 2.5 [IU]/.5ML; [IU]/.5ML; UG/.5ML; UG/.5ML; UG/.5ML
0.5 SUSPENSION INTRAMUSCULAR ONCE
Refills: 0 | Status: DISCONTINUED | OUTPATIENT
Start: 2021-06-17 | End: 2021-06-18

## 2021-06-17 RX ORDER — ACETAMINOPHEN 500 MG
975 TABLET ORAL
Refills: 0 | Status: DISCONTINUED | OUTPATIENT
Start: 2021-06-17 | End: 2021-06-18

## 2021-06-17 RX ORDER — IBUPROFEN 200 MG
600 TABLET ORAL EVERY 6 HOURS
Refills: 0 | Status: COMPLETED | OUTPATIENT
Start: 2021-06-17 | End: 2022-05-16

## 2021-06-17 RX ORDER — HYDROCORTISONE 20 MG
10 TABLET ORAL
Refills: 0 | Status: DISCONTINUED | OUTPATIENT
Start: 2021-06-17 | End: 2021-06-18

## 2021-06-17 RX ORDER — OXYCODONE HYDROCHLORIDE 5 MG/1
5 TABLET ORAL ONCE
Refills: 0 | Status: DISCONTINUED | OUTPATIENT
Start: 2021-06-17 | End: 2021-06-18

## 2021-06-17 RX ORDER — KETOROLAC TROMETHAMINE 30 MG/ML
30 SYRINGE (ML) INJECTION ONCE
Refills: 0 | Status: DISCONTINUED | OUTPATIENT
Start: 2021-06-17 | End: 2021-06-17

## 2021-06-17 RX ADMIN — OXYCODONE HYDROCHLORIDE 5 MILLIGRAM(S): 5 TABLET ORAL at 06:31

## 2021-06-17 RX ADMIN — SODIUM CHLORIDE 3 MILLILITER(S): 9 INJECTION INTRAMUSCULAR; INTRAVENOUS; SUBCUTANEOUS at 14:00

## 2021-06-17 RX ADMIN — ERTAPENEM SODIUM 120 MILLIGRAM(S): 1 INJECTION, POWDER, LYOPHILIZED, FOR SOLUTION INTRAMUSCULAR; INTRAVENOUS at 03:12

## 2021-06-17 RX ADMIN — Medication 10 MILLIGRAM(S): at 08:33

## 2021-06-17 RX ADMIN — Medication 30 MILLIGRAM(S): at 03:11

## 2021-06-17 RX ADMIN — OXYCODONE HYDROCHLORIDE 5 MILLIGRAM(S): 5 TABLET ORAL at 07:04

## 2021-06-17 RX ADMIN — Medication 975 MILLIGRAM(S): at 05:34

## 2021-06-17 RX ADMIN — Medication 975 MILLIGRAM(S): at 19:19

## 2021-06-17 RX ADMIN — Medication 600 MILLIGRAM(S): at 17:10

## 2021-06-17 RX ADMIN — Medication 600 MILLIGRAM(S): at 16:13

## 2021-06-17 RX ADMIN — Medication 100 MILLIGRAM(S): at 00:11

## 2021-06-17 RX ADMIN — Medication 5 MILLIGRAM(S): at 08:33

## 2021-06-17 RX ADMIN — Medication 975 MILLIGRAM(S): at 06:00

## 2021-06-17 RX ADMIN — Medication 600 MILLIGRAM(S): at 10:30

## 2021-06-17 RX ADMIN — Medication 975 MILLIGRAM(S): at 12:35

## 2021-06-17 RX ADMIN — Medication 600 MILLIGRAM(S): at 23:00

## 2021-06-17 RX ADMIN — Medication 1 TABLET(S): at 12:35

## 2021-06-17 RX ADMIN — Medication 10 MILLIGRAM(S): at 20:20

## 2021-06-17 RX ADMIN — Medication 600 MILLIGRAM(S): at 09:36

## 2021-06-17 RX ADMIN — Medication 975 MILLIGRAM(S): at 13:30

## 2021-06-17 RX ADMIN — Medication 975 MILLIGRAM(S): at 18:33

## 2021-06-17 RX ADMIN — SODIUM CHLORIDE 3 MILLILITER(S): 9 INJECTION INTRAMUSCULAR; INTRAVENOUS; SUBCUTANEOUS at 06:06

## 2021-06-17 RX ADMIN — Medication 600 MILLIGRAM(S): at 22:05

## 2021-06-17 RX ADMIN — SODIUM CHLORIDE 3 MILLILITER(S): 9 INJECTION INTRAMUSCULAR; INTRAVENOUS; SUBCUTANEOUS at 22:15

## 2021-06-17 NOTE — DISCHARGE NOTE OB - HOSPITAL COURSE
The patient presented with decreased FM. She underwent IOL for category 2 FHT. She delivered a live female infant on 6/17. She received stress dose steroids throughout her labor and postpartum course.

## 2021-06-17 NOTE — DISCHARGE NOTE OB - MATERIALS PROVIDED
[facility] Screening Program/  Immunization Record/Breastfeeding Log/Bottle Feeding Log/Breastfeeding Mother’s Support Group Information/Guide to Postpartum Care/[facility] Screen Program/Back To Sleep Handout/Shaken Baby Prevention Handout/Breastfeeding Guide and Packet/Birth Certificate Instructions/Discharge Medication Information for Patients and Families Pocket Guide/Tdap Vaccination (VIS Pub Date: 2012)

## 2021-06-17 NOTE — DISCHARGE NOTE OB - PATIENT PORTAL LINK FT
You can access the FollowMyHealth Patient Portal offered by [facility] by registering at the following website: [email or web address]. By joining [employer]’s FollowMyHealth portal, you will also be able to view your health information using other applications (apps) compatible with our system.

## 2021-06-17 NOTE — CHART NOTE - NSCHARTNOTEFT_GEN_A_CORE
AN  Patient seen   Continue induction with cytotec  6 1/2#  Stress dose steroids in labor and delivery as per endocrinology recommendations  FRANCI Mclaughlin
OB Attending Progress Note    Patient seen and evaluated at bedside.  Complaining of rectal pressure.  Comfortable w/ epidural.      T(C): 37.2 (06-16-21 @ 21:00), Max: 37.4 (06-16-21 @ 08:02)  HR: 92 (06-16-21 @ 22:57) (59 - 102)  BP: 109/55 (06-16-21 @ 22:47) (77/42 - 137/81)  RR: 12 (06-16-21 @ 15:48) (12 - 18)  SpO2: 100% (06-16-21 @ 22:57) (90% - 100%)    SVE: 10/100/+1    A/P 26y P0 admitted for IOL for cat 2 tracing      -Labor: fully dilated, on pitocin. Already SROMed  -Fetal Status: overall reassuring with moderate variability and accels  -Analgesia: epidural in place  -CAH - continue stress dose steroids    PARTH New MD
OBGYN Covering Attending Note    Pt seen for d/c vaginal packing and durand catheter.    Vital Signs Last 24 Hrs  T(C): 36.8 (2021 10:34), Max: 37.2 (2021 21:00)  T(F): 98.3 (2021 10:34), Max: 98.96 (2021 21:00)  HR: 64 (2021 05:15) (54 - 135)  BP: 106/51 (2021 05:15) (77/42 - 180/68)  BP(mean): --  ABP: --  ABP(mean): --  RR: 18 (2021 05:15) (12 - 18)  SpO2: 98% (2021 10:34) (83% - 100%)      PPD#0, s/p  w/b/l sulcal and 3rd degree laceration  - packing dc'ed; saturated, but no active bleeding  - Durand catheter removed, due to void  - continue routine postpartum care    Leela Mike MD
PA Note    seen & examined for placement of cervical balloon    VS  T(C): 37.1 (06-16-21 @ 12:23)  HR: 77 (06-16-21 @ 15:42)  BP: 114/58 (06-16-21 @ 15:38)  RR: 18 (06-16-21 @ 00:09)  SpO2: 99% (06-16-21 @ 15:42)    /mod jonatan/+accels/no decels  [place] q 2-4min  2/70/-3 Mettl cervical balloon placed without complication - 60ccs instilled in both the uterine and vaginal balloons  patient tolerated well    cont efm/toco  cont PO cytotec  dw Dr Arnoldo delgado
R2 Note     Call placed to Dr. Karlos Clarke of Endocrinology to discuss postpartum steroid management. Message and call back information left with answering service.     Contact information:   Office: [phone]  Answering Service: [phone]    Nancy Nova MD PGY2
R3    Pt with PMH CAH (21-hydroxy def) and follows with Endocrinologist Dr. Karlos Clarke.  Per Dr. Clarke, patient is currently on hydrocortisone 5mg qAM and 10mg qHS. She needs to be treated for adrenal insufficiency during labor:  - hydrocortisone 25mg IV q6h during labor course  - hydrocortisone 100mg IV at time of delivery  - after delivery, gradually taper to basal dose   Will discuss plan of taper with Dr. Clarke closer to time of delivery.        Of note, unclear baseline. Per patient, baseline usually low (unknown how low).   Baseline appeared to be 110, however at 2a baseline appeared to be 150 with variables.  Discussed tracing with MFM. Overall, fetal status reassuring. Accels present and good variability maintained throughout.  Plan to c/w IOL for cat 2 and will continue to monitor and re-eval.    JOHN Cortez PGY3  d/w Dr. Willard (plan for EFM d/w Dr. Garcia, [facility])
AN  Patient seen   po cyto  cervical balloon  epidural in place  FRANCI Mclaughlin
PA Note    patient seen & examined for pain, requesting pain intervention    VS  T(C): 37.1 (06-16-21 @ 12:23)  HR: 77 (06-16-21 @ 14:37)  BP: 116/67 (06-16-21 @ 14:28)  RR: 18 (06-16-21 @ 00:09)  SpO2: 99% (06-16-21 @ 14:37)    /mod jonatan/+accels/no decels  [place] q 2-3min  VE 2/70/-3     cont efm/toco  plan for epidural & cervical balloon placement   dw Dr Arnoldo delgado

## 2021-06-17 NOTE — DISCHARGE NOTE OB - MEDICATION SUMMARY - MEDICATIONS TO TAKE
I will START or STAY ON the medications listed below when I get home from the hospital:    hydrocortisone 5 mg oral tablet  -- 1 tab(s) by mouth once a day (in the morning)  -- Indication: For CAH    hydrocortisone 10 mg oral tablet  -- 1 tab(s) by mouth once (at bedtime)  -- Indication: For CAH    FLUoxetine 10 mg oral capsule  -- 1 cap(s) by mouth once a day  -- Indication: For Anxiety

## 2021-06-17 NOTE — OB RN DELIVERY SUMMARY - NS_SEPSISRSKCALC_OBGYN_ALL_OB_FT
EOS calculated successfully. EOS Risk Factor: 0.5/1000 live births ([facility] national incidence); GA=40w;Temp=99.32; ROM=1.967; GBS='Positive'; Antibiotics='Broad spectrum antibiotics > 4 hrs prior to birth'

## 2021-06-17 NOTE — DISCHARGE NOTE OB - CARE PLAN
Principal Discharge DX:	Vaginal delivery  Goal:	Routine postpartum care  Assessment and plan of treatment:	Nothing per vagina until seen by your OBGYN. No tub soaking or heavy lifting.  Secondary Diagnosis:	Adrenal abnormality  Goal:	Continue steroids

## 2021-06-17 NOTE — DISCHARGE NOTE OB - PLAN OF CARE
Routine postpartum care Nothing per vagina until seen by your OBGYN. No tub soaking or heavy lifting. Continue steroids

## 2021-06-17 NOTE — DISCHARGE NOTE OB - CARE PROVIDER_API CALL
Chey Kelley)  Obstetrics and Gynecology  [facility]ly [facility], [facility] [place] [address]  Phone: [phone]  Fax: [phone]  Follow Up Time:

## 2021-06-17 NOTE — OB PROVIDER DELIVERY SUMMARY - NSSELHIDDEN_OBGYN_ALL_OB_FT
[NS_DeliveryAttending1_OBGYN_ALL_OB_FT:[id number]],[NS_DeliveryAssist1_OBGYN_ALL_OB_FT:[id number]],[NS_DeliveryRN_OBGYN_ALL_OB_FT:[id number]=]

## 2021-06-17 NOTE — DISCHARGE NOTE OB - BREAST MILK PROVIDES COLOSTRUM THAT IS HIGH IN PROTEIN
Interventional Cardiology Progress Note    Name: Caitlyn Astorga  : 1963  Date: 2019      Right IJ PA catheter placed with ultrasound and fluoroscopic guidance. Tolerated well. Secured into position. RA = 19/15/13  RV = 40/15/17  PA =   PCWP =   Ao sat= 90%  PA sat = 49%    To ICU in stable condition. Advanced CHF consult.     Lyudmila Arreola MD  [phone]  19 Statement Selected

## 2021-06-17 NOTE — OB PROVIDER DELIVERY SUMMARY - NSPROVIDERDELIVERYNOTE_OBGYN_ALL_OB_FT
The patient was fully dilated and pushing.  Delivery of live female infant  Head delivered followed easily by shoulders and body  Infant placed on maternal chest  Delayed cord clamping performed  Cord clamped and cut  Bilateral sulcus tears, right labial laceration and third degree perineal laceration all repaired  Invanz given for infection prophylaxis  Vaginal packing and durand placed due to friable vaginal tissue  EBL 500mL  Will check CBC     PARTH New MD

## 2021-06-17 NOTE — OB RN DELIVERY SUMMARY - NSSELHIDDEN_OBGYN_ALL_OB_FT
[NS_DeliveryAttending1_OBGYN_ALL_OB_FT:[id number]],[NS_DeliveryAssist1_OBGYN_ALL_OB_FT:[id number]],[NS_DeliveryRN_OBGYN_ALL_OB_FT:[id number]=]

## 2021-06-17 NOTE — OB NEONATOLOGY/PEDIATRICIAN DELIVERY SUMMARY - NSPEDSNEONOTESA_OBGYN_ALL_OB_FT
Baby LINH is 40 week gestation FEMALE born to a  27 year old female via  with category II FHT. Maternal history significant for CAH (21-OHase) and PCN allergy. Maternal labs include Blood Type B+, HIV negative, RPR non-reactive, rubella immune, Hep B negative, GBS positive - received 1x dose of vancomycin >4 hours prior to delivery. SROM clear fluids at 22:30 on .Resuscitation included WDSS. Apgars 9/9. Highest maternal temperature 37.2 EOS 0.2. Plans to breast and bottle feed. Wants hepatitis B vaccine.

## 2021-06-18 VITALS
DIASTOLIC BLOOD PRESSURE: 63 MMHG | OXYGEN SATURATION: 98 % | SYSTOLIC BLOOD PRESSURE: 114 MMHG | TEMPERATURE: 98 F | HEART RATE: 83 BPM | RESPIRATION RATE: 16 BRPM

## 2021-06-18 RX ADMIN — Medication 1 TABLET(S): at 12:52

## 2021-06-18 RX ADMIN — Medication 600 MILLIGRAM(S): at 12:56

## 2021-06-18 RX ADMIN — Medication 975 MILLIGRAM(S): at 08:50

## 2021-06-18 RX ADMIN — Medication 5 MILLIGRAM(S): at 08:50

## 2021-06-18 RX ADMIN — Medication 10 MILLIGRAM(S): at 12:52

## 2021-06-18 NOTE — LACTATION INITIAL EVALUATION - INTERVENTION OUTCOME
Triple feeding instructions given  reviewed with patient .  Primary RN made aware of consult and plan./verbalizes understanding/demonstrates understanding of teaching/good return demonstration/needs met

## 2021-06-18 NOTE — LACTATION INITIAL EVALUATION - LACTATION INTERVENTIONS
Mother choose slow flow nipple.  Formula feeding book given to patient ./initiate/review safe skin-to-skin/initiate/review hand expression/initiate/review pumping guidelines and safe milk handling/initiate/review techniques for position and latch/post discharge community resources provided/initiate/review supplementation plan due to medical indications/initiate/review nipple shield use/review techniques to increase milk supply/initiate/review breast massage/compression/initiate/review alternate feeding method/reviewed components of an effective feeding and at least 8 effective feedings per day required/reviewed importance of monitoring infant diapers, the breastfeeding log, and minimum output each day/reviewed risks of artificial nipples/reviewed feeding on demand/by cue at least 8 times a day/recommended follow-up with pediatrician within 24 hours of discharge/reviewed indications of inadequate milk transfer that would require supplementation

## 2021-06-18 NOTE — LACTATION INITIAL EVALUATION - LATCH
Latch observed only with use of nipple shield.  Instruction given to patient with regards of nipple shield use and care.

## 2021-06-18 NOTE — LACTATION INITIAL EVALUATION - NS LACT CON REASON FOR REQ
6.52% weight loss since birth.  Last void 0800, 6/17./no latch x24 hours/primaparous mom/provider request

## 2021-06-19 ENCOUNTER — NON-APPOINTMENT (OUTPATIENT)
Age: 26
End: 2021-06-19

## 2021-06-20 ENCOUNTER — TRANSCRIPTION ENCOUNTER (OUTPATIENT)
Age: 26
End: 2021-06-20

## 2021-06-21 ENCOUNTER — NON-APPOINTMENT (OUTPATIENT)
Age: 26
End: 2021-06-21

## 2021-06-21 ENCOUNTER — RX RENEWAL (OUTPATIENT)
Age: 26
End: 2021-06-21

## 2021-06-21 PROBLEM — F41.9 ANXIETY DISORDER, UNSPECIFIED: Chronic | Status: ACTIVE | Noted: 2021-06-15

## 2021-06-24 ENCOUNTER — APPOINTMENT (OUTPATIENT)
Dept: OBGYN | Facility: CLINIC | Age: 26
End: 2021-06-24

## 2021-06-24 ENCOUNTER — APPOINTMENT (OUTPATIENT)
Dept: ANTEPARTUM | Facility: CLINIC | Age: 26
End: 2021-06-24

## 2021-07-06 ENCOUNTER — NON-APPOINTMENT (OUTPATIENT)
Age: 26
End: 2021-07-06

## 2021-07-13 ENCOUNTER — NON-APPOINTMENT (OUTPATIENT)
Age: 26
End: 2021-07-13

## 2021-07-13 ENCOUNTER — APPOINTMENT (OUTPATIENT)
Dept: OBGYN | Facility: CLINIC | Age: 26
End: 2021-07-13
Payer: COMMERCIAL

## 2021-07-13 VITALS
DIASTOLIC BLOOD PRESSURE: 74 MMHG | BODY MASS INDEX: 30.73 KG/M2 | WEIGHT: 167 LBS | HEIGHT: 62 IN | SYSTOLIC BLOOD PRESSURE: 110 MMHG

## 2021-07-13 DIAGNOSIS — N89.8 OTHER SPECIFIED NONINFLAMMATORY DISORDERS OF VAGINA: ICD-10-CM

## 2021-07-13 PROCEDURE — 99072 ADDL SUPL MATRL&STAF TM PHE: CPT

## 2021-07-13 PROCEDURE — 99212 OFFICE O/P EST SF 10 MIN: CPT

## 2021-07-13 NOTE — HISTORY OF PRESENT ILLNESS
[FreeTextEntry1] : pt presents s/p  3 weeks ago with complaint of brownish discharge and vaginal odor

## 2021-07-15 LAB
CANDIDA VAG CYTO: NOT DETECTED
G VAGINALIS+PREV SP MTYP VAG QL MICRO: NOT DETECTED
T VAGINALIS VAG QL WET PREP: NOT DETECTED

## 2021-07-29 ENCOUNTER — APPOINTMENT (OUTPATIENT)
Dept: OBGYN | Facility: CLINIC | Age: 26
End: 2021-07-29
Payer: COMMERCIAL

## 2021-07-29 VITALS
WEIGHT: 168 LBS | SYSTOLIC BLOOD PRESSURE: 122 MMHG | HEIGHT: 60 IN | BODY MASS INDEX: 32.98 KG/M2 | DIASTOLIC BLOOD PRESSURE: 76 MMHG

## 2021-07-29 PROCEDURE — 0503F POSTPARTUM CARE VISIT: CPT

## 2021-07-29 RX ORDER — NORETHINDRONE 0.35 MG/1
0.35 TABLET ORAL DAILY
Qty: 90 | Refills: 3 | Status: ACTIVE | COMMUNITY
Start: 2021-07-29 | End: 1900-01-01

## 2021-07-29 NOTE — HISTORY OF PRESENT ILLNESS
[Postpartum Follow Up] : postpartum follow up [] : delivered by vaginal delivery [Breastfeeding] : currently nursing [S/Sx PP Depression] : no signs/symptoms of postpartum depression [Back to Normal] : is back to normal in size [None] : no vaginal bleeding

## 2021-09-22 ENCOUNTER — RX RENEWAL (OUTPATIENT)
Age: 26
End: 2021-09-22

## 2021-11-07 ENCOUNTER — TRANSCRIPTION ENCOUNTER (OUTPATIENT)
Age: 26
End: 2021-11-07

## 2021-11-23 ENCOUNTER — APPOINTMENT (OUTPATIENT)
Dept: OBGYN | Facility: CLINIC | Age: 26
End: 2021-11-23
Payer: COMMERCIAL

## 2021-11-23 VITALS
SYSTOLIC BLOOD PRESSURE: 121 MMHG | BODY MASS INDEX: 31.15 KG/M2 | HEIGHT: 61 IN | WEIGHT: 165 LBS | DIASTOLIC BLOOD PRESSURE: 80 MMHG

## 2021-11-23 PROCEDURE — 99395 PREV VISIT EST AGE 18-39: CPT

## 2021-11-23 RX ORDER — NORETHINDRONE 0.35 MG/1
0.35 TABLET ORAL DAILY
Qty: 90 | Refills: 3 | Status: ACTIVE | COMMUNITY
Start: 2021-11-23 | End: 1900-01-01

## 2021-11-29 LAB — CYTOLOGY CVX/VAG DOC THIN PREP: NORMAL

## 2021-12-21 ENCOUNTER — RX RENEWAL (OUTPATIENT)
Age: 26
End: 2021-12-21

## 2021-12-29 ENCOUNTER — RX RENEWAL (OUTPATIENT)
Age: 26
End: 2021-12-29

## 2022-03-21 ENCOUNTER — NON-APPOINTMENT (OUTPATIENT)
Age: 27
End: 2022-03-21

## 2022-04-06 ENCOUNTER — APPOINTMENT (OUTPATIENT)
Dept: OBGYN | Facility: CLINIC | Age: 27
End: 2022-04-06
Payer: COMMERCIAL

## 2022-04-06 ENCOUNTER — ASOB RESULT (OUTPATIENT)
Age: 27
End: 2022-04-06

## 2022-04-06 PROCEDURE — 76817 TRANSVAGINAL US OBSTETRIC: CPT

## 2022-04-12 ENCOUNTER — APPOINTMENT (OUTPATIENT)
Dept: OBGYN | Facility: CLINIC | Age: 27
End: 2022-04-12
Payer: COMMERCIAL

## 2022-04-12 VITALS
HEIGHT: 61 IN | BODY MASS INDEX: 31.72 KG/M2 | SYSTOLIC BLOOD PRESSURE: 118 MMHG | DIASTOLIC BLOOD PRESSURE: 78 MMHG | WEIGHT: 168 LBS

## 2022-04-12 DIAGNOSIS — N91.1 SECONDARY AMENORRHEA: ICD-10-CM

## 2022-04-12 PROCEDURE — 36415 COLL VENOUS BLD VENIPUNCTURE: CPT

## 2022-04-12 PROCEDURE — 99212 OFFICE O/P EST SF 10 MIN: CPT

## 2022-04-13 LAB
ABO + RH PNL BLD: NORMAL
ALBUMIN SERPL ELPH-MCNC: 4.5 G/DL
ALP BLD-CCNC: 125 U/L
ALT SERPL-CCNC: 16 U/L
ANION GAP SERPL CALC-SCNC: 14 MMOL/L
AST SERPL-CCNC: 16 U/L
BASOPHILS # BLD AUTO: 0.04 K/UL
BASOPHILS NFR BLD AUTO: 0.4 %
BILIRUB SERPL-MCNC: 0.4 MG/DL
BLD GP AB SCN SERPL QL: NORMAL
BUN SERPL-MCNC: 13 MG/DL
C TRACH RRNA SPEC QL NAA+PROBE: NOT DETECTED
CALCIUM SERPL-MCNC: 10.4 MG/DL
CHLORIDE SERPL-SCNC: 98 MMOL/L
CO2 SERPL-SCNC: 21 MMOL/L
CREAT SERPL-MCNC: 0.57 MG/DL
EGFR: 127 ML/MIN/1.73M2
EOSINOPHIL # BLD AUTO: 0.06 K/UL
EOSINOPHIL NFR BLD AUTO: 0.6 %
GLUCOSE SERPL-MCNC: 60 MG/DL
HBV SURFACE AG SER QL: NONREACTIVE
HCT VFR BLD CALC: 44 %
HCV AB SER QL: NONREACTIVE
HCV S/CO RATIO: 0.1 S/CO
HGB A MFR BLD: 97.2 %
HGB A2 MFR BLD: 2.8 %
HGB BLD-MCNC: 14 G/DL
HGB FRACT BLD-IMP: NORMAL
HIV1+2 AB SPEC QL IA.RAPID: NONREACTIVE
IMM GRANULOCYTES NFR BLD AUTO: 0.3 %
LYMPHOCYTES # BLD AUTO: 2.89 K/UL
LYMPHOCYTES NFR BLD AUTO: 28.7 %
MAN DIFF?: NORMAL
MCHC RBC-ENTMCNC: 27.5 PG
MCHC RBC-ENTMCNC: 31.8 GM/DL
MCV RBC AUTO: 86.4 FL
MONOCYTES # BLD AUTO: 0.82 K/UL
MONOCYTES NFR BLD AUTO: 8.2 %
N GONORRHOEA RRNA SPEC QL NAA+PROBE: NOT DETECTED
NEUTROPHILS # BLD AUTO: 6.22 K/UL
NEUTROPHILS NFR BLD AUTO: 61.8 %
PLATELET # BLD AUTO: 401 K/UL
POTASSIUM SERPL-SCNC: 4.3 MMOL/L
PROT SERPL-MCNC: 7.1 G/DL
RBC # BLD: 5.09 M/UL
RBC # FLD: 12.8 %
SODIUM SERPL-SCNC: 134 MMOL/L
SOURCE AMPLIFICATION: NORMAL
TSH SERPL-ACNC: 0.5 UIU/ML
WBC # FLD AUTO: 10.06 K/UL

## 2022-04-21 LAB
B19V IGG SER QL IA: 5.64 INDEX
B19V IGG+IGM SER-IMP: NORMAL
B19V IGG+IGM SER-IMP: POSITIVE
B19V IGM FLD-ACNC: 0.19 INDEX
B19V IGM SER-ACNC: NEGATIVE
FMR1 GENE MUT ANL BLD/T: NORMAL
LEAD BLD-MCNC: <1 UG/DL
MEV IGG FLD QL IA: 215 AU/ML
MEV IGG+IGM SER-IMP: POSITIVE
MEV IGM SER QL: <0.91 ISR
RUBV IGG FLD-ACNC: 1.2 INDEX
RUBV IGG SER-IMP: POSITIVE
T PALLIDUM AB SER QL IA: NEGATIVE
VZV AB TITR SER: POSITIVE
VZV IGG SER IF-ACNC: 1706 INDEX

## 2022-04-26 LAB — AR GENE MUT ANL BLD/T: NORMAL

## 2022-05-10 ENCOUNTER — NON-APPOINTMENT (OUTPATIENT)
Age: 27
End: 2022-05-10

## 2022-05-12 ENCOUNTER — LABORATORY RESULT (OUTPATIENT)
Age: 27
End: 2022-05-12

## 2022-05-12 ENCOUNTER — APPOINTMENT (OUTPATIENT)
Dept: OBGYN | Facility: CLINIC | Age: 27
End: 2022-05-12
Payer: COMMERCIAL

## 2022-05-12 ENCOUNTER — ASOB RESULT (OUTPATIENT)
Age: 27
End: 2022-05-12

## 2022-05-12 ENCOUNTER — APPOINTMENT (OUTPATIENT)
Dept: ANTEPARTUM | Facility: CLINIC | Age: 27
End: 2022-05-12
Payer: COMMERCIAL

## 2022-05-12 VITALS
HEIGHT: 61 IN | SYSTOLIC BLOOD PRESSURE: 116 MMHG | WEIGHT: 170 LBS | BODY MASS INDEX: 32.1 KG/M2 | DIASTOLIC BLOOD PRESSURE: 72 MMHG

## 2022-05-12 PROCEDURE — 0502F SUBSEQUENT PRENATAL CARE: CPT

## 2022-05-12 PROCEDURE — 76801 OB US < 14 WKS SINGLE FETUS: CPT | Mod: 59

## 2022-05-12 PROCEDURE — 36416 COLLJ CAPILLARY BLOOD SPEC: CPT

## 2022-05-12 PROCEDURE — 76813 OB US NUCHAL MEAS 1 GEST: CPT

## 2022-05-18 ENCOUNTER — NON-APPOINTMENT (OUTPATIENT)
Age: 27
End: 2022-05-18

## 2022-05-19 ENCOUNTER — NON-APPOINTMENT (OUTPATIENT)
Age: 27
End: 2022-05-19

## 2022-05-22 ENCOUNTER — NON-APPOINTMENT (OUTPATIENT)
Age: 27
End: 2022-05-22

## 2022-05-27 ENCOUNTER — RX RENEWAL (OUTPATIENT)
Age: 27
End: 2022-05-27

## 2022-06-14 ENCOUNTER — APPOINTMENT (OUTPATIENT)
Dept: OBGYN | Facility: CLINIC | Age: 27
End: 2022-06-14
Payer: COMMERCIAL

## 2022-06-14 ENCOUNTER — LABORATORY RESULT (OUTPATIENT)
Age: 27
End: 2022-06-14

## 2022-06-14 VITALS
BODY MASS INDEX: 31.91 KG/M2 | WEIGHT: 169 LBS | SYSTOLIC BLOOD PRESSURE: 102 MMHG | DIASTOLIC BLOOD PRESSURE: 68 MMHG | HEIGHT: 61 IN

## 2022-06-14 PROCEDURE — 0502F SUBSEQUENT PRENATAL CARE: CPT

## 2022-06-14 PROCEDURE — 36415 COLL VENOUS BLD VENIPUNCTURE: CPT

## 2022-06-16 ENCOUNTER — NON-APPOINTMENT (OUTPATIENT)
Age: 27
End: 2022-06-16

## 2022-06-16 LAB — BACTERIA UR CULT: NORMAL

## 2022-06-17 ENCOUNTER — NON-APPOINTMENT (OUTPATIENT)
Age: 27
End: 2022-06-17

## 2022-06-20 ENCOUNTER — APPOINTMENT (OUTPATIENT)
Dept: OBGYN | Facility: CLINIC | Age: 27
End: 2022-06-20

## 2022-06-22 LAB — BACTERIA UR CULT: NORMAL

## 2022-07-13 ENCOUNTER — NON-APPOINTMENT (OUTPATIENT)
Age: 27
End: 2022-07-13

## 2022-07-13 ENCOUNTER — ASOB RESULT (OUTPATIENT)
Age: 27
End: 2022-07-13

## 2022-07-13 ENCOUNTER — APPOINTMENT (OUTPATIENT)
Dept: ANTEPARTUM | Facility: CLINIC | Age: 27
End: 2022-07-13

## 2022-07-13 PROCEDURE — 76811 OB US DETAILED SNGL FETUS: CPT

## 2022-07-14 ENCOUNTER — APPOINTMENT (OUTPATIENT)
Dept: OBGYN | Facility: CLINIC | Age: 27
End: 2022-07-14

## 2022-07-14 VITALS
BODY MASS INDEX: 32.28 KG/M2 | HEIGHT: 61 IN | DIASTOLIC BLOOD PRESSURE: 76 MMHG | WEIGHT: 171 LBS | SYSTOLIC BLOOD PRESSURE: 128 MMHG

## 2022-07-14 PROCEDURE — 0502F SUBSEQUENT PRENATAL CARE: CPT

## 2022-08-01 ENCOUNTER — RX RENEWAL (OUTPATIENT)
Age: 27
End: 2022-08-01

## 2022-08-10 ENCOUNTER — NON-APPOINTMENT (OUTPATIENT)
Age: 27
End: 2022-08-10

## 2022-08-10 ENCOUNTER — APPOINTMENT (OUTPATIENT)
Dept: OBGYN | Facility: CLINIC | Age: 27
End: 2022-08-10

## 2022-08-10 PROCEDURE — 0502F SUBSEQUENT PRENATAL CARE: CPT

## 2022-08-10 PROCEDURE — 36415 COLL VENOUS BLD VENIPUNCTURE: CPT

## 2022-08-12 LAB
BASOPHILS # BLD AUTO: 0.01 K/UL
BASOPHILS NFR BLD AUTO: 0.1 %
EOSINOPHIL # BLD AUTO: 0.07 K/UL
EOSINOPHIL NFR BLD AUTO: 0.6 %
GLUCOSE 1H P 50 G GLC PO SERPL-MCNC: 69 MG/DL
HCT VFR BLD CALC: 35.4 %
HGB BLD-MCNC: 11.4 G/DL
IMM GRANULOCYTES NFR BLD AUTO: 0.6 %
LYMPHOCYTES # BLD AUTO: 2.1 K/UL
LYMPHOCYTES NFR BLD AUTO: 19.2 %
MAN DIFF?: NORMAL
MCHC RBC-ENTMCNC: 29 PG
MCHC RBC-ENTMCNC: 32.2 GM/DL
MCV RBC AUTO: 90.1 FL
MONOCYTES # BLD AUTO: 0.77 K/UL
MONOCYTES NFR BLD AUTO: 7 %
NEUTROPHILS # BLD AUTO: 7.93 K/UL
NEUTROPHILS NFR BLD AUTO: 72.5 %
PLATELET # BLD AUTO: 313 K/UL
RBC # BLD: 3.93 M/UL
RBC # FLD: 13.8 %
WBC # FLD AUTO: 10.95 K/UL

## 2022-09-06 ENCOUNTER — APPOINTMENT (OUTPATIENT)
Dept: ANTEPARTUM | Facility: CLINIC | Age: 27
End: 2022-09-06

## 2022-09-15 ENCOUNTER — ASOB RESULT (OUTPATIENT)
Age: 27
End: 2022-09-15

## 2022-09-15 ENCOUNTER — APPOINTMENT (OUTPATIENT)
Dept: ANTEPARTUM | Facility: CLINIC | Age: 27
End: 2022-09-15

## 2022-09-15 ENCOUNTER — APPOINTMENT (OUTPATIENT)
Dept: OBGYN | Facility: CLINIC | Age: 27
End: 2022-09-15

## 2022-09-15 PROCEDURE — 76819 FETAL BIOPHYS PROFIL W/O NST: CPT | Mod: 59

## 2022-09-15 PROCEDURE — 90471 IMMUNIZATION ADMIN: CPT

## 2022-09-15 PROCEDURE — 76816 OB US FOLLOW-UP PER FETUS: CPT

## 2022-09-15 PROCEDURE — 90715 TDAP VACCINE 7 YRS/> IM: CPT

## 2022-09-15 PROCEDURE — 0502F SUBSEQUENT PRENATAL CARE: CPT

## 2022-10-03 ENCOUNTER — NON-APPOINTMENT (OUTPATIENT)
Age: 27
End: 2022-10-03

## 2022-10-04 ENCOUNTER — APPOINTMENT (OUTPATIENT)
Dept: OBGYN | Facility: CLINIC | Age: 27
End: 2022-10-04

## 2022-10-04 VITALS — WEIGHT: 181 LBS | BODY MASS INDEX: 34.2 KG/M2 | SYSTOLIC BLOOD PRESSURE: 114 MMHG | DIASTOLIC BLOOD PRESSURE: 81 MMHG

## 2022-10-04 PROCEDURE — 0502F SUBSEQUENT PRENATAL CARE: CPT

## 2022-10-04 RX ORDER — ONDANSETRON 4 MG/1
4 TABLET ORAL
Qty: 28 | Refills: 0 | Status: ACTIVE | COMMUNITY
Start: 2022-10-04 | End: 1900-01-01

## 2022-10-21 ENCOUNTER — NON-APPOINTMENT (OUTPATIENT)
Age: 27
End: 2022-10-21

## 2022-10-25 ENCOUNTER — APPOINTMENT (OUTPATIENT)
Dept: OBGYN | Facility: CLINIC | Age: 27
End: 2022-10-25

## 2022-10-25 PROCEDURE — 0502F SUBSEQUENT PRENATAL CARE: CPT

## 2022-11-01 ENCOUNTER — APPOINTMENT (OUTPATIENT)
Dept: ANTEPARTUM | Facility: CLINIC | Age: 27
End: 2022-11-01

## 2022-11-01 ENCOUNTER — ASOB RESULT (OUTPATIENT)
Age: 27
End: 2022-11-01

## 2022-11-01 PROCEDURE — 76819 FETAL BIOPHYS PROFIL W/O NST: CPT | Mod: 59

## 2022-11-01 PROCEDURE — 76816 OB US FOLLOW-UP PER FETUS: CPT

## 2022-11-07 ENCOUNTER — NON-APPOINTMENT (OUTPATIENT)
Age: 27
End: 2022-11-07

## 2022-11-08 ENCOUNTER — APPOINTMENT (OUTPATIENT)
Dept: OBGYN | Facility: CLINIC | Age: 27
End: 2022-11-08

## 2022-11-08 PROCEDURE — 0502F SUBSEQUENT PRENATAL CARE: CPT

## 2022-11-10 ENCOUNTER — NON-APPOINTMENT (OUTPATIENT)
Age: 27
End: 2022-11-10

## 2022-11-11 LAB — B-HEM STREP SPEC QL CULT: ABNORMAL

## 2022-11-13 ENCOUNTER — OUTPATIENT (OUTPATIENT)
Dept: INPATIENT UNIT | Facility: HOSPITAL | Age: 27
LOS: 1 days | Discharge: HOME | End: 2022-11-13

## 2022-11-13 VITALS
DIASTOLIC BLOOD PRESSURE: 66 MMHG | TEMPERATURE: 98 F | RESPIRATION RATE: 17 BRPM | SYSTOLIC BLOOD PRESSURE: 117 MMHG | HEART RATE: 87 BPM

## 2022-11-13 VITALS — SYSTOLIC BLOOD PRESSURE: 102 MMHG | DIASTOLIC BLOOD PRESSURE: 54 MMHG | HEART RATE: 74 BPM

## 2022-11-13 DIAGNOSIS — O26.899 OTHER SPECIFIED PREGNANCY RELATED CONDITIONS, UNSPECIFIED TRIMESTER: ICD-10-CM

## 2022-11-13 DIAGNOSIS — Z3A.00 WEEKS OF GESTATION OF PREGNANCY NOT SPECIFIED: ICD-10-CM

## 2022-11-13 PROCEDURE — 76818 FETAL BIOPHYS PROFILE W/NST: CPT | Mod: 26

## 2022-11-13 PROCEDURE — 99203 OFFICE O/P NEW LOW 30 MIN: CPT

## 2022-11-13 NOTE — OB PROVIDER TRIAGE NOTE - NSHPPHYSICALEXAM_GEN_ALL_CORE
VS  T(C): 36.8 (11-13-22 @ 20:27)  HR: 74 (11-13-22 @ 23:24)  BP: 102/54 (11-13-22 @ 23:24)  RR: 17 (11-13-22 @ 20:27)  SpO2: --    A&O x 3  Lungs: CTAB  Abd: gravid, soft nontender to palpation  EFM: baseline 130, moderate variability, + accels, no decels, ctx q7min  SVE: 0/50/-3  TAS: vertex presentation, anterior placenta, BPP 8/8 KELECHI 16.71

## 2022-11-13 NOTE — OB PROVIDER TRIAGE NOTE - HISTORY OF PRESENT ILLNESS
28 y/o  at 38 weeks presents with decreased fetal movement for 2 hours. States she tried cold drinks and position changes with no improvement Also reports some irregular contractions, 5/10 in severity. Denies leakage of fluid or vaginal bleeding. Denies any AP issues or fetal issues.   GBS positive     Med Hx: Non classic adrenal hypoplasia - seeing endocrinologist for this, anxiety  Meds: Cortef 5mg AM 10mg PM, fluoxetine, PNV  OBGYN Hx:  2021 FT F6#1 (IOL for Cat II)  denies hx of fibroids, ovarian cysts, abnormal pap smear, STDs  Surg Hx: denies  Allergies: amoxicillin - hives, azithromycin - hives, sulfa drugs, duocet - hives    28 y/o  at 38 weeks presents with decreased fetal movement for 2 hours. States she tried cold drinks and position changes with no improvement Also reports some irregular contractions, 5/10 in severity. Denies leakage of fluid or vaginal bleeding. Denies any AP issues or fetal issues.   GBS positive     Med Hx: Non classic adrenal hypoplasia - seeing endocrinologist for this, anxiety  Meds: Cortef 5mg AM 10mg PM, fluoxetine, PNV  OBGYN Hx:  2021 FT F6#1 (IOL for Cat II)  denies hx of fibroids, ovarian cysts, abnormal pap smear, STDs  Surg Hx: denies  Allergies: amoxicillin - hives, azithromycin - hives, sulfa drugs, duricef- hives

## 2022-11-13 NOTE — OB RN TRIAGE NOTE - FALL HARM RISK - UNIVERSAL INTERVENTIONS
Bed in lowest position, wheels locked, appropriate side rails in place/Call bell, personal items and telephone in reach/Instruct patient to call for assistance before getting out of bed or chair/Non-slip footwear when patient is out of bed/[place] to call system/Physically safe environment - no spills, clutter or unnecessary equipment/Purposeful Proactive Rounding/Room/bathroom lighting operational, light cord in reach

## 2022-11-13 NOTE — OB PROVIDER TRIAGE NOTE - NSOBPROVIDERNOTE_OBGYN_ALL_OB_FT
- Patient stable for discharge home with adequate outpatient follow up  - Instructed patient to follow up with OB/GYN as scheduled   - Educated and discussed labor precautions  - Educated and discussed concerning signs/symptoms to call OB/GYN and return to L&D including but not limited to vaginal bleeding, leakage of fluid, painful contractions, decreased fetal movement, fever/chills, shortness of breath, chest pain, rash, difficulty ambulating, nausea/vomiting/diarrhea, worsening of symptoms  - Patient states she understands and agrees with assessment and plan, all questions answered  - Discussed with Dr. Najera 28 y/o  at 38 weeks evaluated for decreased fetal movement and contractions   - Patient reports feeling good fetal movement while in triage, good fetal movement seen on abdominal sonogram, patient verbalizes seeing fetal movement on sonogram  - Reactive NST, BPP   - No evidence of active labor at this time   - Patient stable for discharge home with adequate outpatient follow up  - Instructed patient to follow up with OB/GYN as scheduled on 11/15  - Educated and discussed labor precautions  - Educated and discussed concerning signs/symptoms to call OB/GYN and return to L&D including but not limited to vaginal bleeding, leakage of fluid, painful contractions, decreased fetal movement, fever/chills, shortness of breath, chest pain, rash, difficulty ambulating, nausea/vomiting/diarrhea, worsening of symptoms  - Patient states she understands and agrees with assessment and plan, all questions answered  - Discussed with Dr. Najera

## 2022-11-13 NOTE — OB PROVIDER TRIAGE NOTE - ADDITIONAL INSTRUCTIONS
- Patient stable for discharge home with adequate outpatient follow up  - Instructed patient to follow up with OB/GYN as scheduled   - Educated and discussed labor precautions  - Educated and discussed concerning signs/symptoms to call OB/GYN and return to L&D including but not limited to vaginal bleeding, leakage of fluid, painful contractions, decreased fetal movement, fever/chills, shortness of breath, chest pain, rash, difficulty ambulating, nausea/vomiting/diarrhea, worsening of symptoms  - Patient states she understands and agrees with assessment and plan, all questions answered  - Discussed with Dr. Najera

## 2022-11-13 NOTE — OB RN TRIAGE NOTE - NSICDXPASTMEDICALHX_GEN_ALL_CORE_FT
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

## 2022-11-14 ENCOUNTER — NON-APPOINTMENT (OUTPATIENT)
Age: 27
End: 2022-11-14

## 2022-11-15 ENCOUNTER — APPOINTMENT (OUTPATIENT)
Dept: OBGYN | Facility: CLINIC | Age: 27
End: 2022-11-15

## 2022-11-15 PROCEDURE — 0502F SUBSEQUENT PRENATAL CARE: CPT

## 2022-11-20 ENCOUNTER — INPATIENT (INPATIENT)
Facility: HOSPITAL | Age: 27
LOS: 2 days | Discharge: HOME | End: 2022-11-23
Attending: OBSTETRICS & GYNECOLOGY | Admitting: OBSTETRICS & GYNECOLOGY

## 2022-11-20 VITALS — TEMPERATURE: 98 F

## 2022-11-20 LAB
BASOPHILS # BLD AUTO: 0.02 K/UL — SIGNIFICANT CHANGE UP (ref 0–0.2)
BASOPHILS NFR BLD AUTO: 0.2 % — SIGNIFICANT CHANGE UP (ref 0–2)
EOSINOPHIL # BLD AUTO: 0.06 K/UL — SIGNIFICANT CHANGE UP (ref 0–0.5)
EOSINOPHIL NFR BLD AUTO: 0.5 % — SIGNIFICANT CHANGE UP (ref 0–6)
HCT VFR BLD CALC: 35.4 % — SIGNIFICANT CHANGE UP (ref 34.5–45)
HGB BLD-MCNC: 11.3 G/DL — LOW (ref 11.5–15.5)
IANC: 7.34 K/UL — SIGNIFICANT CHANGE UP (ref 1.8–7.4)
IMM GRANULOCYTES NFR BLD AUTO: 1 % — HIGH (ref 0–0.9)
LYMPHOCYTES # BLD AUTO: 2.67 K/UL — SIGNIFICANT CHANGE UP (ref 1–3.3)
LYMPHOCYTES # BLD AUTO: 23.6 % — SIGNIFICANT CHANGE UP (ref 13–44)
MCHC RBC-ENTMCNC: 27.8 PG — SIGNIFICANT CHANGE UP (ref 27–34)
MCHC RBC-ENTMCNC: 31.9 GM/DL — LOW (ref 32–36)
MCV RBC AUTO: 87.2 FL — SIGNIFICANT CHANGE UP (ref 80–100)
MONOCYTES # BLD AUTO: 1.12 K/UL — HIGH (ref 0–0.9)
MONOCYTES NFR BLD AUTO: 9.9 % — SIGNIFICANT CHANGE UP (ref 2–14)
NEUTROPHILS # BLD AUTO: 7.34 K/UL — SIGNIFICANT CHANGE UP (ref 1.8–7.4)
NEUTROPHILS NFR BLD AUTO: 64.8 % — SIGNIFICANT CHANGE UP (ref 43–77)
NRBC # BLD: 0 /100 WBCS — SIGNIFICANT CHANGE UP (ref 0–0)
NRBC # FLD: 0 K/UL — SIGNIFICANT CHANGE UP (ref 0–0)
PLATELET # BLD AUTO: 307 K/UL — SIGNIFICANT CHANGE UP (ref 150–400)
RBC # BLD: 4.06 M/UL — SIGNIFICANT CHANGE UP (ref 3.8–5.2)
RBC # FLD: 13.7 % — SIGNIFICANT CHANGE UP (ref 10.3–14.5)
WBC # BLD: 11.32 K/UL — HIGH (ref 3.8–10.5)
WBC # FLD AUTO: 11.32 K/UL — HIGH (ref 3.8–10.5)

## 2022-11-20 RX ORDER — SODIUM CHLORIDE 9 MG/ML
1000 INJECTION, SOLUTION INTRAVENOUS
Refills: 0 | Status: DISCONTINUED | OUTPATIENT
Start: 2022-11-20 | End: 2022-11-21

## 2022-11-20 RX ORDER — INFLUENZA VIRUS VACCINE 15; 15; 15; 15 UG/.5ML; UG/.5ML; UG/.5ML; UG/.5ML
0.5 SUSPENSION INTRAMUSCULAR ONCE
Refills: 0 | Status: DISCONTINUED | OUTPATIENT
Start: 2022-11-20 | End: 2022-11-23

## 2022-11-20 RX ORDER — CITRIC ACID/SODIUM CITRATE 300-500 MG
15 SOLUTION, ORAL ORAL EVERY 6 HOURS
Refills: 0 | Status: DISCONTINUED | OUTPATIENT
Start: 2022-11-20 | End: 2022-11-21

## 2022-11-20 RX ORDER — OXYTOCIN 10 UNIT/ML
333.33 VIAL (ML) INJECTION
Qty: 20 | Refills: 0 | Status: DISCONTINUED | OUTPATIENT
Start: 2022-11-20 | End: 2022-11-21

## 2022-11-20 RX ORDER — CHLORHEXIDINE GLUCONATE 213 G/1000ML
1 SOLUTION TOPICAL ONCE
Refills: 0 | Status: DISCONTINUED | OUTPATIENT
Start: 2022-11-20 | End: 2022-11-21

## 2022-11-20 RX ORDER — VANCOMYCIN HCL 1 G
2000 VIAL (EA) INTRAVENOUS EVERY 8 HOURS
Refills: 0 | Status: DISCONTINUED | OUTPATIENT
Start: 2022-11-20 | End: 2022-11-21

## 2022-11-20 NOTE — OB RN PATIENT PROFILE - PRO BLOOD TYPE INFANT
Admitted on 2/21 ED with CHF exacerbation and weight gain of 30lbs and LE edema. Pmhx: CKD 4 was on kidney transplant list (on hold for now), ICD,/4 which was complicated by episode of VT (HD on hold)    Code status: Full     Team: christopher Arroyo    Neuro: ao*4  Cardiac/Tele:  AV paced  Respiratory: room air,   GI/: distended abdomen, urinating via urinal at bedside, oliguric (on HD)   Diet/Appetite: 2g na + K w/ 1.5L FR  Skin: +2-3 edema LE,   Endocrine: ACHS  LDAs: Double lumen PICC sl, CVC non tunneled (tender and bruised at site)  Activity: up ad jorgito  Pain: denies     One time bumex and aranesp shot given    Plan: ablation planned for 3/9     Chelsea Dunlap, RN  Time cared for 0700 - 1530   B negative

## 2022-11-20 NOTE — OB RN PATIENT PROFILE - INTERNATIONAL TRAVEL
Intermediate Repair Preamble Text (Leave Blank If You Do Not Want): Wide undermining was performed with blunt dissection. No

## 2022-11-20 NOTE — OB RN PATIENT PROFILE - FALL HARM RISK - UNIVERSAL INTERVENTIONS
Bed in lowest position, wheels locked, appropriate side rails in place/Call bell, personal items and telephone in reach/Instruct patient to call for assistance before getting out of bed or chair/Non-slip footwear when patient is out of bed/[place] to call system/Physically safe environment - no spills, clutter or unnecessary equipment/Purposeful Proactive Rounding/Room/bathroom lighting operational, light cord in reach

## 2022-11-20 NOTE — OB RN PATIENT PROFILE - SPIRITUAL CULTURAL, CURRENT SITUATION, PROFILE
PHYSICIAN NEXT STEPS:  Review Only    CHIEF COMPLAINT:  Chief Complaint/Protocol Used: Back Injury  Onset: 2 days ago      ASSESSMENT:  ? Onset: 2 days ago  ? Normal True  ? Normal, no trouble breathing True  ? Mechanism: Fell inside her apartment   ? Onset: 2 days ago  ? Location: Lower back  ? Pain: Severe  -------------------------------------------------------    DISPOSITION:  Disposition Recommendation: See Physician within 24 Hours  Questions that led to disposition:  ? [1] High-risk adult (e.g., age > 60, osteoporosis, chronic steroid use) AND [2] still hurts  Patient Directed To: Unspecified  Patient Intended Action: Seek care in the doctor's office       CALL NOTES:  04/25/2020 at 8:59 AM by Radha Chase  ? Positive COVID-19 Screening. Tele visit scheduled 4/25/2020 at 9:40 am with Dr. Herr.     DISPOSITION OVERRIDE/PROVIDER CONSULT:  Disposition Override: N/A  Override Source: Unspecified  Consulted with PCP: No  Consulted with On-Call Physician: No    CALLER CONTACT INFO:  Name: Gladys Lyn (Self)  Phone 1: [phone] (Work Phone)  Phone 2: [phone] (Home Phone)  Phone 3: [phone] (Mobile)      ENCOUNTER STARTED:  04/25/20 08:46:34 AM  ENCOUNTER ASSIGNED TO/CLOSED BY:  Radha Chase @ 04/25/20 08:59:21 AM      -------------------------------------------------------    CARE ADVICE given per Back Injury guideline.  SEE PHYSICIAN WITHIN 24 HOURS:   * IF OFFICE WILL BE OPEN: You need to be seen within the next 24 hours. Call your doctor when the office opens, and make an appointment.  * IF OFFICE WILL BE CLOSED AND NO PCP TRIAGE: You need to be seen within the next 24 hours. An urgent care center is often a good source of care if your doctor's office is closed.  * IF OFFICE WILL BE CLOSED AND PCP TRIAGE REQUIRED: You may need to be seen within the next 24 hours. Your doctor will want to talk with you to decide what's best. I'll page the doctor now. NOTE: Since this isn't serious, hold the  page between 10 pm and   7 am. Page the doctor in the morning.  * IF PATIENT HAS NO PCP: Refer patient to an Urgent Care Center or Retail Clinic. Also try to help caller find a PCP (medical home) for their future care. ?; PAIN MEDICINES:  * For pain relief, take acetaminophen, ibuprofen, or naproxen.  * Use the lowest amount that makes your pain feel better.  ACETAMINOPHEN (E.G., TYLENOL):   * Take 650 mg (two 325 mg pills) by mouth every 4-6 hours as needed. Each Regular Strength Tylenol pill has 325 mg of acetaminophen. The most you should take each day is 3,250 mg (10 Regular Strength pills a day).  * Another choice is to take 1,000 mg (two 500 mg pills) every 8 hours as needed. Each Extra Strength Tylenol pill has 500 mg of acetaminophen. The most you should take each day is 3,000 mg (6 Extra Strength pills a day).  IBUPROFEN (E.G., MOTRIN, ADVIL):  * Take 400 mg (two 200 mg pills) by mouth every 6 hours as needed.  * Another choice is to take 600 mg (three 200 mg pills) by mouth every 8 hours as needed.  * The most you should take each day is 1,200 mg (six 200 mg pills a day), unless your doctor has told you to take more.  NAPROXEN (E.G., ALEVE):  * Take 220 mg (one 220 mg pill) by mouth every 8 hours as needed. You may take 440 mg (two 220 mg pills) for your first dose.  * The most you should take each day is 660 mg (three 220 mg pills a day), unless your doctor has told you to take more.  EXTRA NOTES:  * Acetaminophen is thought to be safer than ibuprofen or naproxen for people over 65 years old. Acetaminophen is in many OTC and prescription medicines. It might be in more than one medicine that you are taking. You need to be careful and not take an   overdose. An acetaminophen overdose can hurt the liver.  * [employer], the company that makes Tylenol, has different dosage instructions for Tylenol in [place] and the [place]. In [place], the maximum recommended dose per day is 4,000 mg or twelve (12)  Regular-Strength (325 mg) pills. In the [place],   Dc recommends a maximum dose of ten (10) Regular-Strength (325 mg) pills.  * Before taking any medicine, read all the instructions on the package.; CALL BACK IF:    * You become worse.      UNDERSTANDS CARE ADVICE: Yes    AGREES WITH CARE ADVICE: Yes    WILL FOLLOW CARE ADVICE: Yes    -------------------------------------------------------   no

## 2022-11-21 ENCOUNTER — TRANSCRIPTION ENCOUNTER (OUTPATIENT)
Age: 27
End: 2022-11-21

## 2022-11-21 DIAGNOSIS — Z79.52 LONG TERM (CURRENT) USE OF SYSTEMIC STEROIDS: ICD-10-CM

## 2022-11-21 DIAGNOSIS — E25.0 CONGENITAL ADRENOGENITAL DISORDERS ASSOCIATED WITH ENZYME DEFICIENCY: ICD-10-CM

## 2022-11-21 LAB
BLD GP AB SCN SERPL QL: NEGATIVE — SIGNIFICANT CHANGE UP
COVID-19 SPIKE DOMAIN AB INTERP: POSITIVE
COVID-19 SPIKE DOMAIN ANTIBODY RESULT: >250 U/ML — HIGH
RH IG SCN BLD-IMP: POSITIVE — SIGNIFICANT CHANGE UP
SARS-COV-2 IGG+IGM SERPL QL IA: >250 U/ML — HIGH
SARS-COV-2 IGG+IGM SERPL QL IA: POSITIVE
T PALLIDUM AB TITR SER: NEGATIVE — SIGNIFICANT CHANGE UP

## 2022-11-21 PROCEDURE — 59400 OBSTETRICAL CARE: CPT

## 2022-11-21 PROCEDURE — 99222 1ST HOSP IP/OBS MODERATE 55: CPT

## 2022-11-21 RX ORDER — KETOROLAC TROMETHAMINE 30 MG/ML
30 SYRINGE (ML) INJECTION ONCE
Refills: 0 | Status: DISCONTINUED | OUTPATIENT
Start: 2022-11-21 | End: 2022-11-23

## 2022-11-21 RX ORDER — FLUOXETINE HCL 10 MG
10 CAPSULE ORAL DAILY
Refills: 0 | Status: DISCONTINUED | OUTPATIENT
Start: 2022-11-21 | End: 2022-11-23

## 2022-11-21 RX ORDER — TETANUS TOXOID, REDUCED DIPHTHERIA TOXOID AND ACELLULAR PERTUSSIS VACCINE, ADSORBED 5; 2.5; 8; 8; 2.5 [IU]/.5ML; [IU]/.5ML; UG/.5ML; UG/.5ML; UG/.5ML
0.5 SUSPENSION INTRAMUSCULAR ONCE
Refills: 0 | Status: DISCONTINUED | OUTPATIENT
Start: 2022-11-21 | End: 2022-11-23

## 2022-11-21 RX ORDER — SIMETHICONE 80 MG/1
80 TABLET, CHEWABLE ORAL EVERY 4 HOURS
Refills: 0 | Status: DISCONTINUED | OUTPATIENT
Start: 2022-11-21 | End: 2022-11-23

## 2022-11-21 RX ORDER — HYDROCORTISONE 20 MG
25 TABLET ORAL EVERY 6 HOURS
Refills: 0 | Status: DISCONTINUED | OUTPATIENT
Start: 2022-11-21 | End: 2022-11-21

## 2022-11-21 RX ORDER — OXYTOCIN 10 UNIT/ML
10 VIAL (ML) INJECTION ONCE
Refills: 0 | Status: COMPLETED | OUTPATIENT
Start: 2022-11-21 | End: 2022-11-21

## 2022-11-21 RX ORDER — DIPHENHYDRAMINE HCL 50 MG
25 CAPSULE ORAL EVERY 6 HOURS
Refills: 0 | Status: DISCONTINUED | OUTPATIENT
Start: 2022-11-21 | End: 2022-11-23

## 2022-11-21 RX ORDER — LANOLIN
1 OINTMENT (GRAM) TOPICAL EVERY 6 HOURS
Refills: 0 | Status: DISCONTINUED | OUTPATIENT
Start: 2022-11-21 | End: 2022-11-23

## 2022-11-21 RX ORDER — HYDROCORTISONE 20 MG
100 TABLET ORAL ONCE
Refills: 0 | Status: COMPLETED | OUTPATIENT
Start: 2022-11-21 | End: 2022-11-21

## 2022-11-21 RX ORDER — HYDROCORTISONE 1 %
1 OINTMENT (GRAM) TOPICAL EVERY 6 HOURS
Refills: 0 | Status: DISCONTINUED | OUTPATIENT
Start: 2022-11-21 | End: 2022-11-23

## 2022-11-21 RX ORDER — IBUPROFEN 200 MG
1 TABLET ORAL
Qty: 0 | Refills: 0 | DISCHARGE
Start: 2022-11-21

## 2022-11-21 RX ORDER — DIBUCAINE 1 %
1 OINTMENT (GRAM) RECTAL EVERY 6 HOURS
Refills: 0 | Status: DISCONTINUED | OUTPATIENT
Start: 2022-11-21 | End: 2022-11-23

## 2022-11-21 RX ORDER — ACETAMINOPHEN 500 MG
3 TABLET ORAL
Qty: 0 | Refills: 0 | DISCHARGE
Start: 2022-11-21

## 2022-11-21 RX ORDER — ACETAMINOPHEN 500 MG
975 TABLET ORAL
Refills: 0 | Status: DISCONTINUED | OUTPATIENT
Start: 2022-11-21 | End: 2022-11-23

## 2022-11-21 RX ORDER — AER TRAVELER 0.5 G/1
1 SOLUTION RECTAL; TOPICAL EVERY 4 HOURS
Refills: 0 | Status: DISCONTINUED | OUTPATIENT
Start: 2022-11-21 | End: 2022-11-23

## 2022-11-21 RX ORDER — HYDROCORTISONE 20 MG
25 TABLET ORAL EVERY 6 HOURS
Refills: 0 | Status: COMPLETED | OUTPATIENT
Start: 2022-11-21 | End: 2022-11-22

## 2022-11-21 RX ORDER — OXYTOCIN 10 UNIT/ML
2 VIAL (ML) INJECTION
Qty: 30 | Refills: 0 | Status: DISCONTINUED | OUTPATIENT
Start: 2022-11-21 | End: 2022-11-21

## 2022-11-21 RX ORDER — IBUPROFEN 200 MG
600 TABLET ORAL EVERY 6 HOURS
Refills: 0 | Status: COMPLETED | OUTPATIENT
Start: 2022-11-21 | End: 2023-10-20

## 2022-11-21 RX ORDER — SODIUM CHLORIDE 9 MG/ML
3 INJECTION INTRAMUSCULAR; INTRAVENOUS; SUBCUTANEOUS EVERY 8 HOURS
Refills: 0 | Status: DISCONTINUED | OUTPATIENT
Start: 2022-11-21 | End: 2022-11-22

## 2022-11-21 RX ORDER — OXYCODONE HYDROCHLORIDE 5 MG/1
5 TABLET ORAL
Refills: 0 | Status: DISCONTINUED | OUTPATIENT
Start: 2022-11-21 | End: 2022-11-23

## 2022-11-21 RX ORDER — MAGNESIUM HYDROXIDE 400 MG/1
30 TABLET, CHEWABLE ORAL
Refills: 0 | Status: DISCONTINUED | OUTPATIENT
Start: 2022-11-21 | End: 2022-11-23

## 2022-11-21 RX ORDER — BENZOCAINE 10 %
1 GEL (GRAM) MUCOUS MEMBRANE EVERY 6 HOURS
Refills: 0 | Status: DISCONTINUED | OUTPATIENT
Start: 2022-11-21 | End: 2022-11-23

## 2022-11-21 RX ORDER — IBUPROFEN 200 MG
600 TABLET ORAL EVERY 6 HOURS
Refills: 0 | Status: DISCONTINUED | OUTPATIENT
Start: 2022-11-21 | End: 2022-11-23

## 2022-11-21 RX ORDER — PRAMOXINE HYDROCHLORIDE 150 MG/15G
1 AEROSOL, FOAM RECTAL EVERY 4 HOURS
Refills: 0 | Status: DISCONTINUED | OUTPATIENT
Start: 2022-11-21 | End: 2022-11-23

## 2022-11-21 RX ORDER — OXYCODONE HYDROCHLORIDE 5 MG/1
5 TABLET ORAL ONCE
Refills: 0 | Status: DISCONTINUED | OUTPATIENT
Start: 2022-11-21 | End: 2022-11-23

## 2022-11-21 RX ADMIN — Medication 250 MILLIGRAM(S): at 00:03

## 2022-11-21 RX ADMIN — Medication 10 UNIT(S): at 14:32

## 2022-11-21 RX ADMIN — Medication 975 MILLIGRAM(S): at 20:54

## 2022-11-21 RX ADMIN — Medication 100 MILLIGRAM(S): at 14:30

## 2022-11-21 RX ADMIN — SODIUM CHLORIDE 125 MILLILITER(S): 9 INJECTION, SOLUTION INTRAVENOUS at 08:32

## 2022-11-21 RX ADMIN — Medication 600 MILLIGRAM(S): at 20:00

## 2022-11-21 RX ADMIN — Medication 600 MILLIGRAM(S): at 19:01

## 2022-11-21 RX ADMIN — SODIUM CHLORIDE 3 MILLILITER(S): 9 INJECTION INTRAMUSCULAR; INTRAVENOUS; SUBCUTANEOUS at 21:00

## 2022-11-21 RX ADMIN — Medication 975 MILLIGRAM(S): at 21:40

## 2022-11-21 RX ADMIN — Medication 2 MILLIUNIT(S)/MIN: at 08:32

## 2022-11-21 RX ADMIN — Medication 25 MILLIGRAM(S): at 04:00

## 2022-11-21 RX ADMIN — Medication 25 MILLIGRAM(S): at 20:39

## 2022-11-21 RX ADMIN — Medication 25 MILLIGRAM(S): at 09:57

## 2022-11-21 RX ADMIN — Medication 250 MILLIGRAM(S): at 08:07

## 2022-11-21 NOTE — CHART NOTE - NSCHARTNOTEFT_GEN_A_CORE
6730    House officer spoke with Dr. Karlos Clarke, patient's endocrinologist, regarding post-partum steroid management. Plan is as follows:  -25mg of IV Hydrocortisone q12h for 24 hours post partum  -After IV Hydrocortisone, patient can restart home Hydrocortisone PO dosage (5mg AM/10mg PM)  -AM JO ANN Villalobos  PGY-1, Obstetrics & Gynecology     d/w Dr. Isatu Alvarado

## 2022-11-21 NOTE — OB RN DELIVERY SUMMARY - NSSELHIDDEN_OBGYN_ALL_OB_FT
[NS_DeliveryAttending1_OBGYN_ALL_OB_FT:[id number]],[NS_DeliveryRN_OBGYN_ALL_OB_FT:[id number]=]

## 2022-11-21 NOTE — DISCHARGE NOTE OB - PATIENT PORTAL LINK FT
You can access the FollowMyHealth Patient Portal offered by [facility] by registering at the following website: [email or web address]. By joining [facility]’s FollowMyHealth portal, you will also be able to view your health information using other applications (apps) compatible with our system.

## 2022-11-21 NOTE — OB PROVIDER DELIVERY SUMMARY - NSEBL_OBGYN_ALL_OB_NU
HAD A 15 HR EPISODE OF AFL ON 5/3/19.  AT/AF BURDEN 2.6%.  V RATES DURING AF > 120 BPM APPROX 15% OF THE TIME.  PT HAD PVI WITH CTI ON 2/26/19.  + ELIQUIS.  PT HAS APPT WITH DR. GRIFFIN ON 5/30/19.     743

## 2022-11-21 NOTE — OB RN DELIVERY SUMMARY - NS_SEPSISRSKCALC_OBGYN_ALL_OB_FT
EOS calculated successfully. EOS Risk Factor: 0.5/1000 live births ([facility] national incidence); GA=39w1d; Temp=98.6; ROM=1.183; GBS='Positive'; Antibiotics='Broad spectrum antibiotics > 4 hrs prior to birth'

## 2022-11-21 NOTE — CHART NOTE - NSCHARTNOTEFT_GEN_A_CORE
Delayed note 2/2 to clinical duties    Patient seen due to spontaneous deceleration to 70s.  FHR returned to baseline with maternal repositioning and IVF bolus by RN by the time I arrived to room.  SVE unchanged from prior.  Patient reassured.      - Encouraged repositioning and resuscitation as needed.    - Continuous EFM/toco    BROOK Dos Santos PGY1

## 2022-11-21 NOTE — CONSULT NOTE ADULT - ASSESSMENT
#Non Classic CAH   home doses: HC 5mg at 8-9am, 10mg at 8-9pm  Recommendations:   - taper to   - continue close monitoring of vitals   - if patient becomes hemodynamically unstable, start stress dose HC 50mg IV q8h   DC planning: likely on home doses of HC 5mg & 10mg, outpatient follow up with private endocrinologist Dr. Karlos Clarke. Patient should obtain medic id.   Patient should take 2-3x her home dose in cases of illness, fever, accidents, and surgery. Pt should receive stress dosing (hydrocortisone 50mg q8) with any major illness or surgical procedure. Patient should monitor BP at home. Should pt be unable to tolerate PO and is unable to take PO hydrocortisone, she will need an emergency injection. Please ensure that patient has Solu-Cortef IM at home prior to discharge, if not discharge with a prescription for 100mg Solu-Cortef Act-O-Vial and the following instructions:  [email or web address]-[email or web address]-[email or web address]-[email or web address]-act-o-vial-[email or web address]-chamber-ampul/           26yo F with non-classical Congenital Adrenal Hyperplasia admitted to L&D, now postpartum. Endocrine consulted for assistance with steroid management.     #Non Classic CAH   home doses: HC 5mg at 8-9am, 10mg at 8-9pm **unclear why patient is on higher dose in the evening (rather than 10mg qAM, 5mg qPM)  Recommendations:   - Hydrocortisone 25mg IV at 8pm tonight, Then taper to HC 25mg IV q12h starting tomorrow 8AM. Will likely taper back down to home dose by Wednesday 11/23/22.   - continue close monitoring of vitals   - if patient becomes hemodynamically unstable, start stress dose HC 50mg IV q8h   DC planning: likely on home doses of HC 5mg & 10mg, outpatient follow up with private endocrinologist Dr. Karlos Clarke. Patient should obtain medic id.   Patient should take 2-3x her home dose in cases of illness, fever, accidents, and surgery. Pt should receive stress dosing (hydrocortisone 50mg q8) with any major illness or surgical procedure. Patient should monitor BP at home. Should pt be unable to tolerate PO and is unable to take PO hydrocortisone, she will need an emergency injection. Please ensure that patient has Solu-Cortef IM at home prior to discharge, if not discharge with a prescription for 100mg Solu-Cortef Act-O-Vial and the following instructions:  [email or web address]-injection-[email or web address]-[email or web address]-solu-cortef-act-o-vial-[email or web address]-chamber-ampul/    Recs discussed with OB resident Dr. Wilfredo Ibarra, DO   Endocrine Fellow  For follow up questions, discharge recommendations, or new consults please call answering service at [phone] (weekdays), [phone] (nights/weekends). For nonurgent matters, please email [email or web address] or [email or web address]

## 2022-11-21 NOTE — CONSULT NOTE ADULT - SUBJECTIVE AND OBJECTIVE BOX
ENDOCRINE INITIAL CONSULT - CAH    HPI:  27y  at 39 GA who presents to L&D for RRIOL. Patient's medical history is notable for non-classical Congenital Adrenal Hyperplasia.  Patient denies vaginal bleeding and leakage of fluid. She endorses good fetal movement and intermittent contractions. No other complaints at this time.     BRIGIDA: 22    Prenatal course uncomplicated.      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 975 milliGRAM(s) Oral <User Schedule>  diphtheria/tetanus/pertussis (acellular) Vaccine (Adacel) 0.5 milliLiter(s) IntraMuscular once  hydrocortisone sodium succinate Injectable 25 milliGRAM(s) IV Push every 6 hours  ibuprofen  Tablet. 600 milliGRAM(s) Oral every 6 hours  influenza   Vaccine 0.5 milliLiter(s) IntraMuscular once  ketorolac   Injectable 30 milliGRAM(s) IV Push once  oxytocin Infusion 333.333 milliUNIT(s)/Min (1000 mL/Hr) IV Continuous <Continuous>  oxytocin Injectable 10 Unit(s) IntraMuscular once  prenatal multivitamin 1 Tablet(s) Oral daily  sodium chloride 0.9% lock flush 3 milliLiter(s) IV Push every 8 hours    MEDICATIONS  (PRN):  benzocaine 20%/menthol 0.5% Spray 1 Spray(s) Topical every 6 hours PRN for Perineal discomfort  dibucaine 1% Ointment 1 Application(s) Topical every 6 hours PRN Perineal discomfort  diphenhydrAMINE 25 milliGRAM(s) Oral every 6 hours PRN Pruritus  hydrocortisone 1% Cream 1 Application(s) Topical every 6 hours PRN Moderate Pain (4-6)  lanolin Ointment 1 Application(s) Topical every 6 hours PRN nipple soreness  magnesium hydroxide Suspension 30 milliLiter(s) Oral two times a day PRN Constipation  oxyCODONE    IR 5 milliGRAM(s) Oral every 3 hours PRN Moderate to Severe Pain (4-10)  oxyCODONE    IR 5 milliGRAM(s) Oral once PRN Moderate to Severe Pain (4-10)  pramoxine 1%/zinc 5% Cream 1 Application(s) Topical every 4 hours PRN Moderate Pain (4-6)  simethicone 80 milliGRAM(s) Chew every 4 hours PRN Gas  witch hazel Pads 1 Application(s) Topical every 4 hours PRN Perineal discomfort      Allergies    amoxicillin (Hives)  Azithromycin 5 Day Dose Pack (Hives)  Duricef (Hives)  sulfa drugs (Anaphylaxis)    Intolerances        REVIEW OF SYSTEMS  Constitutional: No fever  Eyes: No blurry vision  Neuro: No tremors  HEENT: No pain  Cardiovascular: No chest pain, palpitations  Respiratory: No SOB, no cough  GI: No nausea, vomiting, abdominal pain  : No dysuria  Skin: no rash  Psych: no depression  Endocrine: no polyuria, polydipsia  Hem/lymph: no swelling  Osteoporosis: no fractures  ALL OTHER SYSTEMS REVIEWED AND NEGATIVE     PHYSICAL EXAM   Vital Signs Last 24 Hrs  T(C): 37.0 (2022 09:57), Max: 37.0 (2022 09:57)  T(F): 98.6 (2022 09:57), Max: 98.6 (2022 09:57)  HR: 63 (2022 15:11) (59 - 109)  BP: 92/51 (2022 15:07) (81/50 - 120/66)  BP(mean): --  RR: 18 (2022 22:24) (18 - 18)  SpO2: 99% (2022 15:11) (93% - 100%)      GENERAL: NAD, well-groomed, well-developed  EYES: No proptosis, no lid lag, anicteric  HEENT:  Atraumatic, Normocephalic, moist mucous membranes  THYROID: Normal size, no palpable nodules  RESPIRATORY: Clear to auscultation bilaterally; No rales, rhonchi, wheezing  CARDIOVASCULAR: Regular rate and rhythm; No murmurs; no peripheral edema  GI: Soft, nontender, non distended, normal bowel sounds  SKIN: Dry, intact, No rashes or lesions  MUSCULOSKELETAL: Full range of motion, normal strength  NEURO: sensation intact, extraocular movements intact, no tremor  PSYCH: Alert and oriented x 3, normal affect, normal mood  CUSHING'S SIGNS: no striae    CAPILLARY BLOOD GLUCOSE                                    11.3   11.32 )-----------( 307      ( 2022 22:30 )             35.4                   LIPIDS    RADIOLOGY ENDOCRINE INITIAL CONSULT - CAH    HPI:  27y  at 39 GA who presents to L&D for RRIOL. Patient's medical history is notable for non-classical Congenital Adrenal Hyperplasia.  Patient denies vaginal bleeding and leakage of fluid. She endorses good fetal movement and intermittent contractions. No other complaints at this time.     BRIGIDA: 22    Prenatal course uncomplicated.      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   Follows with Dr. Karlos Clarke, outpatient endocrinologist   At home on Hydrocortisone doses 5mg & 10mg, usually takes AM dose around 8:30-9AM and PM dose around 8-9PM.   Does not monitor BP regularly at home, unclear baseline. Per primary RN, SBP 80s-90s currently but asymptomatic and has been in this range all day.Patient says she feels well, tired and hungry. Received 2 dose of HC 25mg IV during labor & 100mg IV at the time of delivery.   Per Ob resident, spoke with Dr. Clarke who recommended HC 25mg IV q12h for 2 more doses then resume patient's home doses.     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 975 milliGRAM(s) Oral <User Schedule>  diphtheria/tetanus/pertussis (acellular) Vaccine (Adacel) 0.5 milliLiter(s) IntraMuscular once  hydrocortisone sodium succinate Injectable 25 milliGRAM(s) IV Push every 6 hours  ibuprofen  Tablet. 600 milliGRAM(s) Oral every 6 hours  influenza   Vaccine 0.5 milliLiter(s) IntraMuscular once  ketorolac   Injectable 30 milliGRAM(s) IV Push once  oxytocin Infusion 333.333 milliUNIT(s)/Min (1000 mL/Hr) IV Continuous <Continuous>  oxytocin Injectable 10 Unit(s) IntraMuscular once  prenatal multivitamin 1 Tablet(s) Oral daily  sodium chloride 0.9% lock flush 3 milliLiter(s) IV Push every 8 hours    MEDICATIONS  (PRN):  benzocaine 20%/menthol 0.5% Spray 1 Spray(s) Topical every 6 hours PRN for Perineal discomfort  dibucaine 1% Ointment 1 Application(s) Topical every 6 hours PRN Perineal discomfort  diphenhydrAMINE 25 milliGRAM(s) Oral every 6 hours PRN Pruritus  hydrocortisone 1% Cream 1 Application(s) Topical every 6 hours PRN Moderate Pain (4-6)  lanolin Ointment 1 Application(s) Topical every 6 hours PRN nipple soreness  magnesium hydroxide Suspension 30 milliLiter(s) Oral two times a day PRN Constipation  oxyCODONE    IR 5 milliGRAM(s) Oral every 3 hours PRN Moderate to Severe Pain (4-10)  oxyCODONE    IR 5 milliGRAM(s) Oral once PRN Moderate to Severe Pain (4-10)  pramoxine 1%/zinc 5% Cream 1 Application(s) Topical every 4 hours PRN Moderate Pain (4-6)  simethicone 80 milliGRAM(s) Chew every 4 hours PRN Gas  witch hazel Pads 1 Application(s) Topical every 4 hours PRN Perineal discomfort      Allergies    amoxicillin (Hives)  Azithromycin 5 Day Dose Pack (Hives)  Duricef (Hives)  sulfa drugs (Anaphylaxis)    Intolerances        REVIEW OF SYSTEMS  Constitutional: No fever  Eyes: No blurry vision  Neuro: No tremors  HEENT: No pain  Cardiovascular: No chest pain, palpitations  Respiratory: No SOB, no cough  GI: No nausea, vomiting, abdominal pain  : No dysuria  Skin: no rash  Psych: no depression  Endocrine: no polyuria, polydipsia  Hem/lymph: no swelling  Osteoporosis: no fractures  ALL OTHER SYSTEMS REVIEWED AND NEGATIVE     PHYSICAL EXAM   Vital Signs Last 24 Hrs  T(C): 37.0 (2022 09:57), Max: 37.0 (2022 09:57)  T(F): 98.6 (2022 09:57), Max: 98.6 (2022 09:57)  HR: 63 (2022 15:11) (59 - 109)  BP: 92/51 (2022 15:07) (81/50 - 120/66)  BP(mean): --  RR: 18 (2022 22:24) (18 - 18)  SpO2: 99% (2022 15:11) (93% - 100%)      GENERAL: NAD, well-groomed, well-developed  EYES: No proptosis, no lid lag, anicteric  HEENT:  Atraumatic, Normocephalic, moist mucous membranes  THYROID: Normal size, no palpable nodules  RESPIRATORY: Clear to auscultation bilaterally; No rales, rhonchi, wheezing  CARDIOVASCULAR: Regular rate and rhythm; No murmurs; no peripheral edema  GI: Soft, nontender, non distended, normal bowel sounds  SKIN: Dry, intact, No rashes or lesions  MUSCULOSKELETAL: Full range of motion, normal strength  NEURO: sensation intact, extraocular movements intact, no tremor  PSYCH: Alert and oriented x 3, reactive affect/mood   CUSHING'S SIGNS: no striae    eMAR:  hydrocortisone sodium succinate Injectable   25 milliGRAM(s) IV Push (22 @ 09:57)   25 milliGRAM(s) IV Push (22 @ 04:00)    hydrocortisone sodium succinate Injectable   100 milliGRAM(s) IV Push (22 @ 14:30)                            11.3   11.32 )-----------( 307      ( 2022 22:30 )             35.4           LIPIDS    RADIOLOGY

## 2022-11-21 NOTE — CHART NOTE - NSCHARTNOTEFT_GEN_A_CORE
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 Karlos Clarke of Endocrinology.     [employer] chart reviewed.     As per chart note scanned on 8/16/2022, patient has been maintained on Hydrocortisone 5mg qAM and 10mg qHS throughout pregnancy. She will require the following while in labor:    - 25mg Hydrocortisone IV q6h   - 100mg Hydrocortisone IV at time of delivery   - Dose to be gradually tapered after delivery to reach her basal replacement dose      Hydrocortisone 25mg q6h and 100mg to be given at the time of delivery ordered.     Amparo Daniels MD, PGY4

## 2022-11-21 NOTE — OB RN DELIVERY SUMMARY - NS_LABORCHARACTER_OBGYN_ALL_OB
Induction of labor-AROM/Induction of labor-Medicinal/Augmentation of labor/External electronic FM/Antibiotics in labor/Steroids in labor/Meconium staining

## 2022-11-21 NOTE — DISCHARGE NOTE OB - MATERIALS PROVIDED
Immunization Record/Breastfeeding Log/Breastfeeding Mother’s Support Group Information/Guide to Postpartum Care/Shaken Baby Prevention Handout/Birth Certificate Instructions

## 2022-11-21 NOTE — OB PROVIDER H&P - ASSESSMENT
A/P:  27y  at 39 GA who presents to L&D for RRIOL. Patient's medical history is notable for non-classical Congenital Adrenal Hyperplasia.   -Admit to L&D  -Consents signed  -Admission labs  -CLD  -IV fluids  -Labor: IOL with PO cytotec  -Fetus: Cat I tracing. Continuous toco and fetal monitoring.   -GBS: Positive, treatment with Vancomycin running at half dose due to history of red man syndrome   -Analgesia: Epidural PRN  -Steroids during labor and postpartum per private endocrinologist: during labor hydrocortisone 25mg IV q6; at delivery: hydrocortisone 100mg IV; postpartum steroids gradually tapered to home dose    Discussed with Dr. Mclaughlin by FLAKO Daniels PGY4  BROOK Dos Santos PGY1

## 2022-11-21 NOTE — OB PROVIDER H&P - NSHPPHYSICALEXAM_GEN_ALL_CORE
T(C): 36.6 (11-20-22 @ 22:24), Max: 36.6 (11-20-22 @ 21:50)  HR: 82 (11-20-22 @ 22:24) (82 - 82)  BP: 98/62 (11-20-22 @ 22:24) (98/62 - 98/62)  RR: 18 (11-20-22 @ 22:24) (18 - 18)  SpO2: --    Gen: NAD, well-appearing, AAOx3   Abd: Soft, gravid  Ext: non-tender, non-edematous  SVE:  3.5/50/-3  Bedside sono: vertex  FHT: 100bpm, moderate variability, +accels, no decels  [place]: q2-5

## 2022-11-21 NOTE — OB PROVIDER LABOR PROGRESS NOTE - NS_SUBJECTIVE/OBJECTIVE_OBGYN_ALL_OB_FT
Patient seen and examined at bed side  Tracing now Reasuring from recent small variables over all Cat1  Patient states she is feeling well    SVE: 5/70/-2 AROM light mec  CNX: q3-4  EFM: Cat1    Plan  Continue pitocin

## 2022-11-21 NOTE — DISCHARGE NOTE OB - NS MD DC FALL RISK RISK
For information on Fall & Injury Prevention, visit: [email or web address] OR  [email or web address] OR  https://www.cdc.gov/steadi/patient.html

## 2022-11-21 NOTE — OB PROVIDER DELIVERY SUMMARY - NSPROVIDERDELIVERYNOTE_OBGYN_ALL_OB_FT
SVE 9.5/100/1. Completely dilated shortly thereafter. 100mg IV Hydrocortisone administered given imminent delivery    Head delivered in JOEL. No nuchal. Anterior shoulder was delivered followed by the posterior shoulder and remainder of the body.     Infant passed to the mother. Cord clamping was delayed for 1 minute. Cord clamped and cut. Infant handed to pediatrics at the bedside given category II tracing. Cord gasses obtained via a segment of cord.     Placenta was delivered spontaneously intact. Uterine massage was performed and Oxytocin was given upon delivery of the placenta. Fundus firm. Pt had an episode atony during the repair, ameliorated w/ bimanual exam and IM Oxytocin 10U    First degree laceration repaired 2-0 Chromic in a running fashion with interrupted 2-0 Vicryl for the perineal skin    Adequate hemostasis. QBL 486cc  Count correct x2. SVE 9.5/100/1. Completely dilated shortly thereafter. 100mg IV Hydrocortisone administered given imminent delivery for h/o non-classical CAH    Head delivered in JOEL. No nuchal. Anterior shoulder was delivered followed by the posterior shoulder and remainder of the body.     Infant passed to the mother. Cord clamping was delayed for 1 minute. Cord clamped and cut. Infant handed to pediatrics at the bedside given category II tracing. Cord gasses obtained via a segment of cord.     Placenta was delivered spontaneously intact. Uterine massage was performed and Oxytocin was given upon delivery of the placenta. Fundus firm. Pt had an episode atony during the repair, ameliorated w/ bimanual exam and IM Oxytocin 10U    First degree laceration repaired 2-0 Chromic in a running fashion with interrupted 2-0 Vicryl for the perineal skin, right vaginal laceration repaired with 2-0 Chromic     Adequate hemostasis. QBL 486cc  Count correct x2.

## 2022-11-21 NOTE — DISCHARGE NOTE OB - MEDICATION SUMMARY - MEDICATIONS TO CHANGE
I will SWITCH the dose or number of times a day I take the medications listed below when I get home from the hospital:  None I will SWITCH the dose or number of times a day I take the medications listed below when I get home from the hospital:    Cortef 5 mg oral tablet  -- orally once a day    Cortef 10 mg oral tablet  -- orally once a day (at bedtime)

## 2022-11-21 NOTE — OB PROVIDER H&P - HISTORY OF PRESENT ILLNESS
27y  at 38 GA who presents to L&D for RRIOL. Patient's medical history is notable for non-classical Congenital Adrenal Hyperplasia.  Patient denies vaginal bleeding and leakage of fluid. She endorses good fetal movement and intermittent contractions. No other complaints at this time.     BRIGIDA: 21    Prenatal course uncomplicated.      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

## 2022-11-21 NOTE — CONSULT NOTE ADULT - ATTENDING COMMENTS
Patient with non-classic CAH on chronic hydrocortisone for labor delivery today now postpartum.  Given stress dose hydrocortisone peripartum. Can taper hydrocortisone as outlined above.  Will follow.    Jenae Long MD  Division of Endocrinology  Pager: [phone]    If after 6PM or before 9AM, or on weekends/holidays, please call endocrine answering service for assistance ([phone]).  For nonurgent matters email [email or web address] for assistance.

## 2022-11-21 NOTE — DISCHARGE NOTE OB - MEDICATION SUMMARY - MEDICATIONS TO TAKE
I will START or STAY ON the medications listed below when I get home from the hospital:    Cortef 5 mg oral tablet  -- orally once a day  -- Indication: For History of congenital adrenal hyperplasia    Cortef 10 mg oral tablet  -- orally once a day (at bedtime)  -- Indication: For History of congenital adrenal hyperplasia    acetaminophen 325 mg oral tablet  -- 3 tab(s) by mouth every 6 hours  -- Indication: For  (normal spontaneous vaginal delivery)    ibuprofen 600 mg oral tablet  -- 1 tab(s) by mouth every 6 hours  -- Indication: For  (normal spontaneous vaginal delivery)    Prenatal Multivitamins with Folic Acid 1 mg oral tablet  -- 1 tab(s) by mouth once a day  -- Indication: For  (normal spontaneous vaginal delivery)   I will START or STAY ON the medications listed below when I get home from the hospital:    hydrocortisone 20 mg oral tablet  -- 1 tab(s) by mouth at bedtime   Wednesday 11/23 & Thursday 11/24  -- Indication: For History of congenital adrenal hyperplasia    hydrocortisone 10 mg oral tablet  -- 1 tab(s) by mouth once a day  In the morning 11/24  -- Indication: For History of congenital adrenal hyperplasia    Cortef 10 mg oral tablet  -- orally once a day (at bedtime)  Resume home dose starting Friday11/25/22  -- Indication: For History of congenital adrenal hyperplasia    Cortef 5 mg oral tablet  -- orally once a day  Resume home dose Friday 11/25/22  -- Indication: For History of congenital adrenal hyperplasia    ibuprofen 600 mg oral tablet  -- 1 tab(s) by mouth every 6 hours, As Needed  -- Indication: For pain    acetaminophen 325 mg oral tablet  -- 3 tab(s) by mouth every 6 hours, As Needed  -- Indication: For pain    Prenatal Multivitamins with Folic Acid 1 mg oral tablet  -- 1 tab(s) by mouth once a day  -- Indication: For vitamins

## 2022-11-21 NOTE — DISCHARGE NOTE OB - CARE PLAN
1 Principal Discharge DX:	 (normal spontaneous vaginal delivery)  Assessment and plan of treatment:	- continue routine pain mgt.  - continue perineal care   - pelvic rest for 6 wk  - Patient to follow up in 6 wk  - Please notify MD if you experience persistent and overwhelming emotions inhibiting daily activities or infant care, persistent headache, excessive vaginal bleeding, foul smelling vaginal discharge, Urinary urgency/frequency/burning, or localized lower extremity swelling/warmth/redness.  Secondary Diagnosis:	History of congenital adrenal hyperplasia

## 2022-11-21 NOTE — OB PROVIDER DELIVERY SUMMARY - NSSELHIDDEN_OBGYN_ALL_OB_FT
[NS_DeliveryAttending1_OBGYN_ALL_OB_FT:[id number]=],[NS_DeliveryRN_OBGYN_ALL_OB_FT:[id number]=],[NS_DeliveryAssist1_OBGYN_ALL_OB_FT:[id number]=]

## 2022-11-21 NOTE — DISCHARGE NOTE OB - NS AS DC PROVIDER CONTACT Y/N MULTI
Pratima Prabhakar, 11/20/1930, 79year old, female    Chief Complaint:  Patient presents with:   Follow - Up: CHF, bilateral heel wounds, bilaterl ankle fx, hyperglycemia  Pain    Subjective:      Patient is an 79year old female with previous medical histor normocephalic; normal nose, no nasal drainage, mucous membranes pink, moist.  NECK: supple, non tender, FROM  BREAST: deferred  RESPIRATORY:clear, no crackles, no wheezing, no dyspnea, no cough, room air  CARDIOVASCULAR: RRR, S1 and S2, no murmurs,  No ped resolving  - monitoring now  - renal eval inpt follow up with DR Niharika Powell prn  - creat 1.5 from  from 1.3  - monitor on increased torsemide dosing     Hyperuricemia with GOUT  - Uric acid 11.9 5/2/19  - ankle pain improved     B/L ankle fractures right antoino thursdays  TSH 6/13/2019     Follow Ups:  PCP within 7 days of [facility] APN cardiology /Dr Tyron Luz APN, TCC post DC  Ortho follow up daughter will schedule  Renal as needed    Mark Hebert, APRN  5/30/2019 Yes

## 2022-11-21 NOTE — DISCHARGE NOTE OB - CARE PROVIDER_API CALL
Chey Kelley)  Obstetrics and Gynecology  [facility] [place] [address]  Phone: [phone]  Fax: [phone]  Established Patient  Follow Up Time:

## 2022-11-22 ENCOUNTER — APPOINTMENT (OUTPATIENT)
Dept: OBGYN | Facility: CLINIC | Age: 27
End: 2022-11-22

## 2022-11-22 LAB
ANION GAP SERPL CALC-SCNC: 12 MMOL/L — SIGNIFICANT CHANGE UP (ref 7–14)
BASOPHILS # BLD AUTO: 0.02 K/UL — SIGNIFICANT CHANGE UP (ref 0–0.2)
BASOPHILS NFR BLD AUTO: 0.1 % — SIGNIFICANT CHANGE UP (ref 0–2)
BUN SERPL-MCNC: 11 MG/DL — SIGNIFICANT CHANGE UP (ref 7–23)
CALCIUM SERPL-MCNC: 8.5 MG/DL — SIGNIFICANT CHANGE UP (ref 8.4–10.5)
CHLORIDE SERPL-SCNC: 103 MMOL/L — SIGNIFICANT CHANGE UP (ref 98–107)
CO2 SERPL-SCNC: 21 MMOL/L — LOW (ref 22–31)
CREAT SERPL-MCNC: 0.59 MG/DL — SIGNIFICANT CHANGE UP (ref 0.5–1.3)
EGFR: 126 ML/MIN/1.73M2 — SIGNIFICANT CHANGE UP
EOSINOPHIL # BLD AUTO: 0 K/UL — SIGNIFICANT CHANGE UP (ref 0–0.5)
EOSINOPHIL NFR BLD AUTO: 0 % — SIGNIFICANT CHANGE UP (ref 0–6)
GLUCOSE SERPL-MCNC: 141 MG/DL — HIGH (ref 70–99)
HCT VFR BLD CALC: 28.3 % — LOW (ref 34.5–45)
HGB BLD-MCNC: 9.1 G/DL — LOW (ref 11.5–15.5)
IANC: 14.57 K/UL — HIGH (ref 1.8–7.4)
IMM GRANULOCYTES NFR BLD AUTO: 1.4 % — HIGH (ref 0–0.9)
LYMPHOCYTES # BLD AUTO: 1.88 K/UL — SIGNIFICANT CHANGE UP (ref 1–3.3)
LYMPHOCYTES # BLD AUTO: 10.7 % — LOW (ref 13–44)
MCHC RBC-ENTMCNC: 28 PG — SIGNIFICANT CHANGE UP (ref 27–34)
MCHC RBC-ENTMCNC: 32.2 GM/DL — SIGNIFICANT CHANGE UP (ref 32–36)
MCV RBC AUTO: 87.1 FL — SIGNIFICANT CHANGE UP (ref 80–100)
MONOCYTES # BLD AUTO: 0.89 K/UL — SIGNIFICANT CHANGE UP (ref 0–0.9)
MONOCYTES NFR BLD AUTO: 5.1 % — SIGNIFICANT CHANGE UP (ref 2–14)
NEUTROPHILS # BLD AUTO: 14.57 K/UL — HIGH (ref 1.8–7.4)
NEUTROPHILS NFR BLD AUTO: 82.7 % — HIGH (ref 43–77)
NRBC # BLD: 0 /100 WBCS — SIGNIFICANT CHANGE UP (ref 0–0)
NRBC # FLD: 0 K/UL — SIGNIFICANT CHANGE UP (ref 0–0)
PLATELET # BLD AUTO: 282 K/UL — SIGNIFICANT CHANGE UP (ref 150–400)
POTASSIUM SERPL-MCNC: 3.8 MMOL/L — SIGNIFICANT CHANGE UP (ref 3.5–5.3)
POTASSIUM SERPL-SCNC: 3.8 MMOL/L — SIGNIFICANT CHANGE UP (ref 3.5–5.3)
RBC # BLD: 3.25 M/UL — LOW (ref 3.8–5.2)
RBC # FLD: 13.6 % — SIGNIFICANT CHANGE UP (ref 10.3–14.5)
SODIUM SERPL-SCNC: 136 MMOL/L — SIGNIFICANT CHANGE UP (ref 135–145)
WBC # BLD: 17.6 K/UL — HIGH (ref 3.8–10.5)
WBC # FLD AUTO: 17.6 K/UL — HIGH (ref 3.8–10.5)

## 2022-11-22 PROCEDURE — 99233 SBSQ HOSP IP/OBS HIGH 50: CPT | Mod: GC

## 2022-11-22 RX ORDER — HYDROCORTISONE 20 MG
10 TABLET ORAL
Refills: 0 | Status: DISCONTINUED | OUTPATIENT
Start: 2022-11-23 | End: 2022-11-23

## 2022-11-22 RX ORDER — FERROUS SULFATE 325(65) MG
325 TABLET ORAL THREE TIMES A DAY
Refills: 0 | Status: DISCONTINUED | OUTPATIENT
Start: 2022-11-22 | End: 2022-11-23

## 2022-11-22 RX ORDER — HYDROCORTISONE 20 MG
25 TABLET ORAL ONCE
Refills: 0 | Status: COMPLETED | OUTPATIENT
Start: 2022-11-22 | End: 2022-11-22

## 2022-11-22 RX ORDER — SENNA PLUS 8.6 MG/1
1 TABLET ORAL
Refills: 0 | Status: DISCONTINUED | OUTPATIENT
Start: 2022-11-22 | End: 2022-11-23

## 2022-11-22 RX ORDER — ASCORBIC ACID 60 MG
500 TABLET,CHEWABLE ORAL DAILY
Refills: 0 | Status: DISCONTINUED | OUTPATIENT
Start: 2022-11-22 | End: 2022-11-23

## 2022-11-22 RX ORDER — HYDROCORTISONE 20 MG
10 TABLET ORAL
Refills: 0 | Status: DISCONTINUED | OUTPATIENT
Start: 2022-11-22 | End: 2022-11-22

## 2022-11-22 RX ORDER — HYDROCORTISONE 20 MG
20 TABLET ORAL
Refills: 0 | Status: DISCONTINUED | OUTPATIENT
Start: 2022-11-23 | End: 2022-11-23

## 2022-11-22 RX ORDER — HYDROCORTISONE 20 MG
10 TABLET ORAL
Refills: 0 | Status: CANCELLED | OUTPATIENT
Start: 2022-11-25 | End: 2022-11-23

## 2022-11-22 RX ORDER — HYDROCORTISONE 20 MG
5 TABLET ORAL
Refills: 0 | Status: DISCONTINUED | OUTPATIENT
Start: 2022-11-23 | End: 2022-11-23

## 2022-11-22 RX ORDER — HYDROCORTISONE 20 MG
5 TABLET ORAL
Refills: 0 | Status: CANCELLED | OUTPATIENT
Start: 2022-11-25 | End: 2022-11-23

## 2022-11-22 RX ADMIN — Medication 600 MILLIGRAM(S): at 12:04

## 2022-11-22 RX ADMIN — Medication 975 MILLIGRAM(S): at 16:00

## 2022-11-22 RX ADMIN — Medication 600 MILLIGRAM(S): at 06:45

## 2022-11-22 RX ADMIN — Medication 975 MILLIGRAM(S): at 02:31

## 2022-11-22 RX ADMIN — Medication 600 MILLIGRAM(S): at 17:37

## 2022-11-22 RX ADMIN — Medication 975 MILLIGRAM(S): at 15:05

## 2022-11-22 RX ADMIN — Medication 10 MILLIGRAM(S): at 08:27

## 2022-11-22 RX ADMIN — Medication 25 MILLIGRAM(S): at 22:15

## 2022-11-22 RX ADMIN — Medication 25 MILLIGRAM(S): at 08:26

## 2022-11-22 RX ADMIN — Medication 600 MILLIGRAM(S): at 13:00

## 2022-11-22 RX ADMIN — Medication 600 MILLIGRAM(S): at 23:47

## 2022-11-22 RX ADMIN — Medication 600 MILLIGRAM(S): at 07:15

## 2022-11-22 RX ADMIN — Medication 25 MILLIGRAM(S): at 02:30

## 2022-11-22 RX ADMIN — Medication 975 MILLIGRAM(S): at 09:04

## 2022-11-22 RX ADMIN — Medication 975 MILLIGRAM(S): at 03:29

## 2022-11-22 RX ADMIN — Medication 975 MILLIGRAM(S): at 10:00

## 2022-11-22 RX ADMIN — Medication 600 MILLIGRAM(S): at 00:06

## 2022-11-22 RX ADMIN — Medication 600 MILLIGRAM(S): at 00:43

## 2022-11-22 RX ADMIN — Medication 975 MILLIGRAM(S): at 21:10

## 2022-11-22 RX ADMIN — Medication 975 MILLIGRAM(S): at 21:42

## 2022-11-22 RX ADMIN — Medication 1 TABLET(S): at 12:05

## 2022-11-22 NOTE — PROGRESS NOTE ADULT - ASSESSMENT
26yo F with non-classical Congenital Adrenal Hyperplasia admitted to L&D, now postpartum. Endocrine consulted for assistance with steroid management.     #Non Classic CAH   #chronic systemic steroid use   home doses: HC 5mg at 8-9am, 10mg at 8-9pm   Recommendations:   - Hydrocortisone 25mg IV at 8pm tonight, Then taper to home doses HC 5mg PO q8AM and 10mg PO q8PM starting Wednesday 11/23/22.   - continue close hemodynamic monitoring   - if patient becomes hemodynamically unstable, start stress dose HC 50mg IV q8h   DC planning: likely on home doses of HC 5mg & 10mg, outpatient follow up with private endocrinologist Dr. Karlos Clarke. Patient should obtain medic id.   Patient should take 2-3x her home dose in cases of illness, fever, accidents, and surgery. Pt should receive stress dosing (hydrocortisone 50mg q8) with any major illness or surgical procedure. Patient should monitor BP at home. Should pt be unable to tolerate PO and is unable to take PO hydrocortisone, she will need an emergency injection. Please ensure that patient has Solu-Cortef IM at home prior to discharge, if not discharge with a prescription for 100mg Solu-Cortef Act-O-Vial and the following instructions:  [email or web address]-injection/emergency-injection-cortico-steroids-solu-cortef-act-o-vial-two-chamber-ampul/      Cassandra Ibarra DO   Endocrine Fellow  For follow up questions, discharge recommendations, or new consults please call answering service at [phone] (weekdays), [phone] (nights/weekends). For nonurgent matters, please email [email or web address] or [email or web address]          28yo F with non-classical Congenital Adrenal Hyperplasia admitted to L&D, now postpartum. Endocrine consulted for assistance with steroid management.     #Non Classic CAH   #chronic systemic steroid use   home doses: HC 5mg at 8-9am, 10mg at 8-9pm   bp soft but unclear baseline, no hx of htn   Recommendations:   - Hydrocortisone 25mg PO x 1 more dose for tonight   - Taper to Hydrocortisone 10mg PO q8AM and 20mg PO q8PM for Wednesday 11/23 & Thursday 11/24 (double home doses)   - Taper down to home Hydrocortisone doses 5mg PO q8AM and 10mg PO q8PM starting Friday 11/25  - continue close hemodynamic monitoring   - if patient becomes hemodynamically unstable, start stress dose HC 50mg IV q8h   DC planning: on Hydrocortisone taper as outline above. Please discharge patient with BP monitor. Outpatient follow up with private endocrinologist Dr. Karlos Clarke. Patient should obtain medic id.   Patient should take 2-3x her home dose in cases of illness, fever, accidents, and surgery. Pt should receive stress dosing (hydrocortisone 50mg q8) with any major illness or surgical procedure. Should pt be unable to tolerate PO and is unable to take PO hydrocortisone, she will need an emergency injection. Please ensure that patient has Solu-Cortef IM at home prior to discharge, if not discharge with a prescription for 100mg Solu-Cortef Act-O-Vial and the following instructions:  [email or web address]/emergency-injection/emergency-injection-cortico-steroids-solu-cortef-act-o-vial-two-chamber-ampul/      Cassandra Ibarra DO   Endocrine Fellow  For follow up questions, discharge recommendations, or new consults please call answering service at [phone] (weekdays), [phone] (nights/weekends). For nonurgent matters, please email [email or web address] or [email or web address]          26yo F with non-classical Congenital Adrenal Hyperplasia admitted to L&D, now postpartum. Endocrine consulted for assistance with steroid management.     #Non Classic CAH   #chronic systemic steroid use   home doses: HC 5mg at 8-9am, 10mg at 8-9pm   bp soft but unclear baseline, no hx of htn   Recommendations:   - Hydrocortisone 25mg PO x 1 more dose for tonight 8pm  - Taper to Hydrocortisone 10mg PO q8AM and 20mg PO q8PM for Wednesday 11/23 & Thursday 11/24 (double home doses)   - Taper down to home Hydrocortisone doses 5mg PO q8AM and 10mg PO q8PM starting Friday 11/25  - continue close hemodynamic monitoring   - if patient becomes hemodynamically unstable, start stress dose HC 50mg IV q8h   DC planning: on Hydrocortisone taper as outline above. Please discharge patient with BP monitor. Outpatient follow up with private endocrinologist Dr. Karlos Clarke. Patient should obtain medic id.   Patient should take 2-3x her home dose in cases of illness, fever, accidents, and surgery. Pt should receive stress dosing (hydrocortisone 50mg q8) with any major illness or surgical procedure. Should pt be unable to tolerate PO and is unable to take PO hydrocortisone, she will need an emergency injection. Please ensure that patient has Solu-Cortef IM at home prior to discharge, if not discharge with a prescription for 100mg Solu-Cortef Act-O-Vial and the following instructions:  [email or web address]/emergency-injection/emergency-injection-cortico-steroids-solu-cortef-act-o-vial-two-chamber-ampul/    discussed with OB ACP Isatu Muir.     Cassandra Ibarra,    Endocrine Fellow  For follow up questions, discharge recommendations, or new consults please call answering service at [phone] (weekdays), [phone] (nights/weekends). For nonurgent matters, please email [email or web address] or [email or web address]

## 2022-11-22 NOTE — PROGRESS NOTE ADULT - ASSESSMENT
A/P: 26yo PPD#1 s/p  c/b non-classical CAH. Patient is stable and doing well post-partum.   - Pain well controlled, continue current pain regimen  - Increase ambulation  - Continue regular diet    #Non classical CAH  - AM BMP reviewed and reassuring  - Will order for x1 more dose of IV Hydrocortisone at 0830 with plans to transition to home PO regimen thereafter     Leroy Villalobos, PGY-1  Ob/Gyn

## 2022-11-23 ENCOUNTER — RX RENEWAL (OUTPATIENT)
Age: 27
End: 2022-11-23

## 2022-11-23 VITALS
TEMPERATURE: 98 F | OXYGEN SATURATION: 100 % | DIASTOLIC BLOOD PRESSURE: 65 MMHG | RESPIRATION RATE: 20 BRPM | HEART RATE: 63 BPM | SYSTOLIC BLOOD PRESSURE: 110 MMHG

## 2022-11-23 RX ORDER — HYDROCORTISONE 20 MG
1 TABLET ORAL
Qty: 0 | Refills: 0 | DISCHARGE
Start: 2022-11-23

## 2022-11-23 RX ADMIN — Medication 10 MILLIGRAM(S): at 11:36

## 2022-11-23 RX ADMIN — Medication 1 TABLET(S): at 11:36

## 2022-11-23 RX ADMIN — Medication 600 MILLIGRAM(S): at 05:46

## 2022-11-23 RX ADMIN — Medication 600 MILLIGRAM(S): at 12:25

## 2022-11-23 RX ADMIN — Medication 975 MILLIGRAM(S): at 08:40

## 2022-11-23 RX ADMIN — Medication 500 MILLIGRAM(S): at 11:37

## 2022-11-23 RX ADMIN — Medication 975 MILLIGRAM(S): at 04:05

## 2022-11-23 RX ADMIN — Medication 325 MILLIGRAM(S): at 11:37

## 2022-11-23 RX ADMIN — Medication 600 MILLIGRAM(S): at 11:36

## 2022-11-23 RX ADMIN — Medication 975 MILLIGRAM(S): at 09:20

## 2022-11-23 RX ADMIN — SENNA PLUS 1 TABLET(S): 8.6 TABLET ORAL at 05:46

## 2022-11-23 RX ADMIN — Medication 325 MILLIGRAM(S): at 05:46

## 2022-11-23 RX ADMIN — Medication 10 MILLIGRAM(S): at 08:41

## 2022-11-23 RX ADMIN — Medication 600 MILLIGRAM(S): at 00:17

## 2022-11-23 RX ADMIN — Medication 600 MILLIGRAM(S): at 06:30

## 2022-11-23 RX ADMIN — Medication 975 MILLIGRAM(S): at 03:20

## 2022-11-23 NOTE — PROGRESS NOTE ADULT - ATTENDING COMMENTS
Pt seen and examined and agree with above  d/c planning   L MD Luís
agree with plan above
Patient with non-classic CAH on chronic hydrocortisone for labor delivery today now postpartum.  Given stress dose hydrocortisone peripartum. Can taper hydrocortisone as outlined above.  Plan to give double home dose starting tomorrow (10mg/20mg). If BP stable will be ok for dc from endocrine standpoint.  Of note patient's home hydrocortisone regimen is 5mg AM and 10mg PM and has been so chronically and she is doing well on it. Follow up outpatient with Dr. Clarke.  Will follow.    Jenae Long MD  Division of Endocrinology  Pager: [phone]    If after 6PM or before 9AM, or on weekends/holidays, please call endocrine answering service for assistance ([phone]).  For nonurgent matters email [email or web address] for assistance.

## 2022-11-23 NOTE — PROGRESS NOTE ADULT - ASSESSMENT
28yo F with non-classical Congenital Adrenal Hyperplasia admitted to L&D, now postpartum. Endocrine consulted for assistance with steroid management.     #Non Classic CAH   #chronic systemic steroid use   home doses: HC 5mg at 8-9am, 10mg at 8-9pm   bp soft but unclear baseline, no hx of htn   Recommendations:   - Continue Hydrocortisone 10mg PO q8AM and 20mg PO q8PM on today & tomorrow, Thursday 11/24 (double home doses)   - Taper down to home Hydrocortisone doses 5mg PO q8AM and 10mg PO q8PM starting Friday 11/25  - continue close hemodynamic monitoring   - if patient becomes hemodynamically unstable, start stress dose HC 50mg IV q8h   DC planning: on Hydrocortisone taper as outline above. Please discharge patient with BP monitor. Outpatient follow up with private endocrinologist Dr. Karlos Clarke. Patient should obtain medic id.   Patient should take 2-3x her home dose in cases of illness, fever, accidents, and surgery. Pt should receive stress dosing (hydrocortisone 50mg q8) with any major illness or surgical procedure. Should pt be unable to tolerate PO and is unable to take PO hydrocortisone, she will need an emergency injection. Please ensure that patient has Solu-Cortef IM at home prior to discharge, if not discharge with a prescription for 100mg Solu-Cortef Act-O-Vial and the following instructions:  [email or web address].[email or web address]/emergency-injection/emergency-injection-cortico-steroids-solu-cortef-act-o-vial-two-chamber-ampul/        Cassandra Ibarra DO   Endocrine Fellow  For follow up questions, discharge recommendations, or new consults please call answering service at [phone] (weekdays), [phone] (nights/weekends). For nonurgent matters, please email [email or web address] or [email or web address]

## 2022-11-23 NOTE — PROGRESS NOTE ADULT - SUBJECTIVE AND OBJECTIVE BOX
S: 28 yo PPD#2 s/p . c/b Non-Classical CAH. pt. had no overnight complaints. Patient feels well. Pain is well controlled. She is tolerating a regular diet and passing flatus. She is voiding spontaneously, and ambulating without difficulty. Denies CP/SOB. Denies lightheadedness/dizziness. Denies N/V.    O:  Vitals:   Vital Signs Last 24 Hrs  T(C): 37 (2022 22:19), Max: 37 (2022 14:33)  T(F): 98.6 (2022 22:19), Max: 98.6 (2022 14:33)  HR: 78 (2022 22:19) (55 - 78)  BP: 115/53 (2022 22:19) (101/51 - 115/53)  BP(mean): --  RR: 16 (2022 22:19) (16 - 18)  SpO2: 97% (2022 22:19) (97% - 98%)    Parameters below as of 2022 22:19  Patient On (Oxygen Delivery Method): room air        MEDICATIONS  (STANDING):  acetaminophen     Tablet .. 975 milliGRAM(s) Oral <User Schedule>  ascorbic acid 500 milliGRAM(s) Oral daily  diphtheria/tetanus/pertussis (acellular) Vaccine (Adacel) 0.5 milliLiter(s) IntraMuscular once  ferrous    sulfate 325 milliGRAM(s) Oral three times a day  FLUoxetine 10 milliGRAM(s) Oral daily  hydrocortisone 5 milliGRAM(s) Oral <User Schedule>  hydrocortisone 10 milliGRAM(s) Oral <User Schedule>  hydrocortisone 20 milliGRAM(s) Oral <User Schedule>  ibuprofen  Tablet. 600 milliGRAM(s) Oral every 6 hours  influenza   Vaccine 0.5 milliLiter(s) IntraMuscular once  ketorolac   Injectable 30 milliGRAM(s) IV Push once  prenatal multivitamin 1 Tablet(s) Oral daily  senna 1 Tablet(s) Oral two times a day    MEDICATIONS  (PRN):  benzocaine 20%/menthol 0.5% Spray 1 Spray(s) Topical every 6 hours PRN for Perineal discomfort  dibucaine 1% Ointment 1 Application(s) Topical every 6 hours PRN Perineal discomfort  diphenhydrAMINE 25 milliGRAM(s) Oral every 6 hours PRN Pruritus  hydrocortisone 1% Cream 1 Application(s) Topical every 6 hours PRN Moderate Pain (4-6)  lanolin Ointment 1 Application(s) Topical every 6 hours PRN nipple soreness  magnesium hydroxide Suspension 30 milliLiter(s) Oral two times a day PRN Constipation  oxyCODONE    IR 5 milliGRAM(s) Oral every 3 hours PRN Moderate to Severe Pain (4-10)  oxyCODONE    IR 5 milliGRAM(s) Oral once PRN Moderate to Severe Pain (4-10)  pramoxine 1%/zinc 5% Cream 1 Application(s) Topical every 4 hours PRN Moderate Pain (4-6)  simethicone 80 milliGRAM(s) Chew every 4 hours PRN Gas  witch hazel Pads 1 Application(s) Topical every 4 hours PRN Perineal discomfort      Labs:  Blood type: B Positive  Rubella IgG: RPR: Negative                          9.1<L>   17.60<H> >-----------< 282    (  @ 06:41 )             28.3<L>                        11.3<L>   11.32<H> >-----------< 307    (  @ 22:30 )             35.4    - @ 06:41      136  |  103  |  11  ----------------------------<  141<H>  3.8   |  21<L>  |  0.59        Ca    8.5      2022 06:41            Physical Exam:  General: NAD, Alert, Ox4, found pt. resting in bed  Abdomen: soft, non-tender, non-distended, fundus firm  Vaginal: Lochia LIGHT/ wnl  Extremities: No erythema/edema. Denies calf tenderness/pain    A/P: 28yo GP PPD#2 s/p .  Patient is stable and doing well post-partum.    - Pain well controlled, continue current pain regimen  - Increase ambulation, SCDs when not ambulating  - Continue regular diet/ Hydration  -PP care reviewed   -Notify OB with concerns of heavy bleeding, large clots, fever/chills.  -f/u in 6 weeks w/ OB/Gyn office for PP check-up    Non-Classical CAH  - Seen by Endocrinologist  - Pt. aware of Plan of care  -Hydrocortisone  Tappered  -d/c home on P.O   - Discharge planning     Odalis Ordonez NP
ENDOCRINE FOLLOW UP     Chief Complaint: CAH     History: Patient received HC 25mg IV q6h overnight 8:30pm, 2:30pm, and again this morning at 8; Now tapered to HC 10mg PO starting at 8pm by primary team. BP remains soft/but stable. No prior hx of HTN, asymptomatic & unknown baseline BP.     MEDICATIONS  (STANDING):  acetaminophen     Tablet .. 975 milliGRAM(s) Oral <User Schedule>  diphtheria/tetanus/pertussis (acellular) Vaccine (Adacel) 0.5 milliLiter(s) IntraMuscular once  FLUoxetine 10 milliGRAM(s) Oral daily  hydrocortisone 10 milliGRAM(s) Oral <User Schedule>  ibuprofen  Tablet. 600 milliGRAM(s) Oral every 6 hours  influenza   Vaccine 0.5 milliLiter(s) IntraMuscular once  ketorolac   Injectable 30 milliGRAM(s) IV Push once  prenatal multivitamin 1 Tablet(s) Oral daily  sodium chloride 0.9% lock flush 3 milliLiter(s) IV Push every 8 hours    MEDICATIONS  (PRN):  benzocaine 20%/menthol 0.5% Spray 1 Spray(s) Topical every 6 hours PRN for Perineal discomfort  dibucaine 1% Ointment 1 Application(s) Topical every 6 hours PRN Perineal discomfort  diphenhydrAMINE 25 milliGRAM(s) Oral every 6 hours PRN Pruritus  hydrocortisone 1% Cream 1 Application(s) Topical every 6 hours PRN Moderate Pain (4-6)  lanolin Ointment 1 Application(s) Topical every 6 hours PRN nipple soreness  magnesium hydroxide Suspension 30 milliLiter(s) Oral two times a day PRN Constipation  oxyCODONE    IR 5 milliGRAM(s) Oral every 3 hours PRN Moderate to Severe Pain (4-10)  oxyCODONE    IR 5 milliGRAM(s) Oral once PRN Moderate to Severe Pain (4-10)  pramoxine 1%/zinc 5% Cream 1 Application(s) Topical every 4 hours PRN Moderate Pain (4-6)  simethicone 80 milliGRAM(s) Chew every 4 hours PRN Gas  witch hazel Pads 1 Application(s) Topical every 4 hours PRN Perineal discomfort      Allergies    amoxicillin (Hives)  Azithromycin 5 Day Dose Pack (Hives)  Duricef (Hives)  sulfa drugs (Anaphylaxis)    Intolerances        ROS: All other systems reviewed and negative    PHYSICAL EXAM:  VITALS: T(C): 36.8 (11-22-22 @ 06:36)  T(F): 98.2 (11-22-22 @ 06:36), Max: 98.6 (11-21-22 @ 15:07)  HR: 78 (11-22-22 @ 06:36) (61 - 96)  BP: 97/55 (11-22-22 @ 06:36) (87/53 - 111/57)  RR:  (16 - 18)  SpO2:  (96% - 100%)  Wt(kg): --  GENERAL: NAD, resting comfortably   EYES: No proptosis,  anicteric  HEENT:  Atraumatic, Normocephalic, moist mucous membranes  RESPIRATORY: Nonlabored respirations on room air, normal rate/effort   CARDIOVASCULAR: Regular rate and rhythm; No murmurs  GI: Soft, nontender, non distended, normal bowel sounds  NEURO: AOx3, moves all extremities spontaneously   PSYCH:  reactive affect, euthymic mood        11-22    136  |  103  |  11  ----------------------------<  141<H>  3.8   |  21<L>  |  0.59    eGFR: 126    Ca    8.5      11-22            
OB Progress Note:  PPD#1    S: 28yo PPD#1 s/p  c/b non-classical CAH. Pain controlled. Patient tolerating regular diet, voiding spontaneously, and ambulating without difficulty. Denies significant VB, chest pain, shortness of breath, n/v, light-headedness/dizziness.       O:  Vitals:  Vital Signs Last 24 Hrs  T(C): 36.8 (2022 06:36), Max: 37.0 (2022 09:57)  T(F): 98.2 (2022 06:36), Max: 98.6 (2022 09:57)  HR: 78 (2022 06:36) (61 - 109)  BP: 97/55 (2022 06:36) (84/48 - 114/64)  BP(mean): --  RR: 18 (2022 06:36) (16 - 18)  SpO2: 100% (2022 06:36) (94% - 100%)        MEDICATIONS  (STANDING):  acetaminophen     Tablet .. 975 milliGRAM(s) Oral <User Schedule>  diphtheria/tetanus/pertussis (acellular) Vaccine (Adacel) 0.5 milliLiter(s) IntraMuscular once  FLUoxetine 10 milliGRAM(s) Oral daily  hydrocortisone sodium succinate Injectable 25 milliGRAM(s) IV Push once  ibuprofen  Tablet. 600 milliGRAM(s) Oral every 6 hours  influenza   Vaccine 0.5 milliLiter(s) IntraMuscular once  ketorolac   Injectable 30 milliGRAM(s) IV Push once  prenatal multivitamin 1 Tablet(s) Oral daily  sodium chloride 0.9% lock flush 3 milliLiter(s) IV Push every 8 hours      Labs:  Blood type: B Positive  Rubella IgG: RPR: Negative                          9.1<L>   -- >-----------< 282    (  @ 06:41 )             28.3<L>                        11.3<L>   11.32<H> >-----------< 307    (  @ 22:30 )             35.4    22 @ 06:41      136  |  103  |  11  ----------------------------<  141<H>  3.8   |  21<L>  |  0.59        Ca    8.5      2022 06:41            Physical Exam:  General: No acute distress  Respiratory: No respiratory distress. Unlabored breathing   Abdomen: Soft. Non-tender. Non-distended. Fundus is firm  Extremities: No calf tenderness bilaterally    
ENDOCRINE FOLLOW UP     Chief Complaint: cah    History: Patient seen earlier today, resting comfortably with baby boy & . Feeling well, planned for dc today.     MEDICATIONS  (STANDING):  acetaminophen     Tablet .. 975 milliGRAM(s) Oral <User Schedule>  ascorbic acid 500 milliGRAM(s) Oral daily  diphtheria/tetanus/pertussis (acellular) Vaccine (Adacel) 0.5 milliLiter(s) IntraMuscular once  ferrous    sulfate 325 milliGRAM(s) Oral three times a day  FLUoxetine 10 milliGRAM(s) Oral daily  hydrocortisone 10 milliGRAM(s) Oral <User Schedule>  hydrocortisone 20 milliGRAM(s) Oral <User Schedule>  ibuprofen  Tablet. 600 milliGRAM(s) Oral every 6 hours  influenza   Vaccine 0.5 milliLiter(s) IntraMuscular once  ketorolac   Injectable 30 milliGRAM(s) IV Push once  prenatal multivitamin 1 Tablet(s) Oral daily  senna 1 Tablet(s) Oral two times a day    MEDICATIONS  (PRN):  benzocaine 20%/menthol 0.5% Spray 1 Spray(s) Topical every 6 hours PRN for Perineal discomfort  dibucaine 1% Ointment 1 Application(s) Topical every 6 hours PRN Perineal discomfort  diphenhydrAMINE 25 milliGRAM(s) Oral every 6 hours PRN Pruritus  hydrocortisone 1% Cream 1 Application(s) Topical every 6 hours PRN Moderate Pain (4-6)  lanolin Ointment 1 Application(s) Topical every 6 hours PRN nipple soreness  magnesium hydroxide Suspension 30 milliLiter(s) Oral two times a day PRN Constipation  oxyCODONE    IR 5 milliGRAM(s) Oral every 3 hours PRN Moderate to Severe Pain (4-10)  oxyCODONE    IR 5 milliGRAM(s) Oral once PRN Moderate to Severe Pain (4-10)  pramoxine 1%/zinc 5% Cream 1 Application(s) Topical every 4 hours PRN Moderate Pain (4-6)  simethicone 80 milliGRAM(s) Chew every 4 hours PRN Gas  witch hazel Pads 1 Application(s) Topical every 4 hours PRN Perineal discomfort      Allergies    amoxicillin (Hives)  Azithromycin 5 Day Dose Pack (Hives)  Duricef (Hives)  sulfa drugs (Anaphylaxis)    Intolerances        ROS: All other systems reviewed and negative    PHYSICAL EXAM:  VITALS: T(C): 36.8 (11-23-22 @ 12:00)  T(F): 98.2 (11-23-22 @ 12:00), Max: 98.7 (11-23-22 @ 06:00)  HR: 63 (11-23-22 @ 12:00) (55 - 78)  BP: 110/65 (11-23-22 @ 12:00) (101/51 - 115/53)  RR:  (16 - 20)  SpO2:  (97% - 100%)  Wt(kg): --  GENERAL: NAD, resting comfortably   EYES: No proptosis,  anicteric  HEENT:  Atraumatic, Normocephalic, moist mucous membranes  RESPIRATORY: Nonlabored respirations on room air, normal rate/effort   NEURO: AOx3, moves all extremities spontaneously   PSYCH:  reactive affect, euthymic mood        11-22    136  |  103  |  11  ----------------------------<  141<H>  3.8   |  21<L>  |  0.59    eGFR: 126    Ca    8.5      11-22

## 2023-01-04 ENCOUNTER — APPOINTMENT (OUTPATIENT)
Dept: OBGYN | Facility: CLINIC | Age: 28
End: 2023-01-04
Payer: COMMERCIAL

## 2023-01-04 VITALS — SYSTOLIC BLOOD PRESSURE: 135 MMHG | DIASTOLIC BLOOD PRESSURE: 83 MMHG

## 2023-01-04 DIAGNOSIS — Z01.419 ENCOUNTER FOR GYNECOLOGICAL EXAMINATION (GENERAL) (ROUTINE) W/OUT ABNORMAL FINDINGS: ICD-10-CM

## 2023-01-04 PROCEDURE — 99395 PREV VISIT EST AGE 18-39: CPT

## 2023-01-04 RX ORDER — FLUCONAZOLE 150 MG/1
150 TABLET ORAL
Qty: 2 | Refills: 0 | Status: ACTIVE | COMMUNITY
Start: 2023-01-04 | End: 1900-01-01

## 2023-01-04 RX ORDER — NORETHINDRONE 0.35 MG/1
0.35 TABLET ORAL DAILY
Qty: 90 | Refills: 3 | Status: ACTIVE | COMMUNITY
Start: 2023-01-04 | End: 1900-01-01

## 2023-01-04 NOTE — HISTORY OF PRESENT ILLNESS
[FreeTextEntry1] : pt is a 28 y/o p2 presents for annual gyn visit s/p  6 weeks ago currently breastfeeding

## 2023-01-11 LAB — CYTOLOGY CVX/VAG DOC THIN PREP: NORMAL

## 2023-02-06 ENCOUNTER — NON-APPOINTMENT (OUTPATIENT)
Age: 28
End: 2023-02-06

## 2023-02-20 ENCOUNTER — RX RENEWAL (OUTPATIENT)
Age: 28
End: 2023-02-20

## 2023-05-12 ENCOUNTER — OUTPATIENT (OUTPATIENT)
Dept: OUTPATIENT SERVICES | Facility: HOSPITAL | Age: 28
LOS: 1 days | End: 2023-05-12
Payer: COMMERCIAL

## 2023-05-12 ENCOUNTER — APPOINTMENT (OUTPATIENT)
Dept: ULTRASOUND IMAGING | Facility: IMAGING CENTER | Age: 28
End: 2023-05-12
Payer: COMMERCIAL

## 2023-05-12 DIAGNOSIS — Z00.8 ENCOUNTER FOR OTHER GENERAL EXAMINATION: ICD-10-CM

## 2023-05-12 PROCEDURE — 88173 CYTOPATH EVAL FNA REPORT: CPT | Mod: 26

## 2023-05-12 PROCEDURE — 10005 FNA BX W/US GDN 1ST LES: CPT

## 2023-05-12 PROCEDURE — 88173 CYTOPATH EVAL FNA REPORT: CPT

## 2023-05-12 PROCEDURE — 10006 FNA BX W/US GDN EA ADDL: CPT

## 2023-05-12 PROCEDURE — 88172 CYTP DX EVAL FNA 1ST EA SITE: CPT

## 2023-05-16 LAB — NON-GYNECOLOGICAL CYTOLOGY STUDY: SIGNIFICANT CHANGE UP

## 2023-05-24 ENCOUNTER — RX RENEWAL (OUTPATIENT)
Age: 28
End: 2023-05-24

## 2023-05-30 ENCOUNTER — LABORATORY RESULT (OUTPATIENT)
Age: 28
End: 2023-05-30

## 2023-05-30 ENCOUNTER — APPOINTMENT (OUTPATIENT)
Dept: SURGERY | Facility: CLINIC | Age: 28
End: 2023-05-30
Payer: COMMERCIAL

## 2023-05-30 VITALS — WEIGHT: 160 LBS | HEIGHT: 61 IN | BODY MASS INDEX: 30.21 KG/M2

## 2023-05-30 DIAGNOSIS — E04.2 NONTOXIC MULTINODULAR GOITER: ICD-10-CM

## 2023-05-30 PROCEDURE — 36415 COLL VENOUS BLD VENIPUNCTURE: CPT

## 2023-05-30 PROCEDURE — 99204 OFFICE O/P NEW MOD 45 MIN: CPT

## 2023-05-31 NOTE — ASSESSMENT
[FreeTextEntry1] : Patient with newly diagnosed papillary thyroid cancer and a multinodular goiter.  I have discussed a left thyroid lobectomy with paratracheal dissection versus a total thyroidectomy.  The patient is reluctant to proceed with a total thyroidectomy despite family history and bilateral nodules.  I have requested an ultrasound-guided fine-needle aspiration of her new right 9 mm nodule.  She will contact my office to review results.   I have reviewed the pathophysiology of the disease process, the area anatomy and the rationale for surgery.  I discussed the risks, benefits and alternative treatments which include but are not limited to bleeding, infection, numbness, hoarseness, hypocalcemia, scarring, and need for reoperation.  I have answered the patient's questions to their satisfaction.  The patient wishes to proceed with the recommended procedure.  They will contact my office to schedule surgery.\par

## 2023-05-31 NOTE — PHYSICAL EXAM
[de-identified] : no cervical or supraclavicular adenopathy, trachea midline, thyroid without enlargement or palpable mass [Normal] : no neck adenopathy [de-identified] : Skin:  normal appearance.  no rash, nodules, vesicles, or erythema,\par Musculoskeletal:  full range of motion and no deformities appreciated\par Neurological:  grossly intact\par Psychiatric:  oriented to person, place and time with appropriate affect

## 2023-05-31 NOTE — REASON FOR VISIT
[Initial Consultation] : an initial consultation for [FreeTextEntry2] : Papillary thyroid cancer in a multinodular goiter. [Spouse] : spouse

## 2023-05-31 NOTE — HISTORY OF PRESENT ILLNESS
[de-identified] : Patient referred by Dr. Clarke for evaluation of newly diagnosed papillary thyroid cancer.  Patient reports a several year history of thyroid nodules.  Reports family history of papillary thyroid cancer in 2 of her sisters, additional 2 sisters with thyroid nodules with benign biopsies.  Patient denies dysphagia, change in voice or radiation exposure.  Thyroid ultrasound January 2023: Right lobe 5.8 x 1.9 x 1.8 cm with 9 mm mid nodule not previously noted, TR 4, right lower 1.2 x 0.7 x 1.2 cm increased from prior study.  Biopsy benign.  Left lobe 5.3 x 1.1 x 1.7 cm with lower nodule measuring 10 x 7 x 9 mm increased from prior study, TR 4.  Biopsy positive for papillary thyroid cancer.  Stable 6 mm isthmus nodule.  Blood work: Calcium 9.6, TSH 1.4, free T4 1.1.  I have reviewed all old and new data and available images.  Additional information was obtained from others present at the time of the visit to ensure the completeness of the history.\par

## 2023-06-01 ENCOUNTER — NON-APPOINTMENT (OUTPATIENT)
Age: 28
End: 2023-06-01

## 2023-06-01 LAB
T3 SERPL-MCNC: 152 NG/DL
T4 FREE SERPL-MCNC: 1 NG/DL
THYROGLOB AB SERPL-ACNC: <20 IU/ML
THYROGLOB SERPL-MCNC: 84 NG/ML
TSH SERPL-ACNC: 1.5 UIU/ML

## 2023-06-15 ENCOUNTER — NON-APPOINTMENT (OUTPATIENT)
Age: 28
End: 2023-06-15

## 2023-06-21 ENCOUNTER — LABORATORY RESULT (OUTPATIENT)
Age: 28
End: 2023-06-21

## 2023-06-22 ENCOUNTER — NON-APPOINTMENT (OUTPATIENT)
Age: 28
End: 2023-06-22

## 2023-06-22 ENCOUNTER — OUTPATIENT (OUTPATIENT)
Dept: OUTPATIENT SERVICES | Facility: HOSPITAL | Age: 28
LOS: 1 days | End: 2023-06-22
Payer: COMMERCIAL

## 2023-06-22 VITALS
RESPIRATION RATE: 16 BRPM | HEIGHT: 61 IN | WEIGHT: 162.7 LBS | SYSTOLIC BLOOD PRESSURE: 102 MMHG | DIASTOLIC BLOOD PRESSURE: 67 MMHG | TEMPERATURE: 98 F | HEART RATE: 54 BPM | OXYGEN SATURATION: 100 %

## 2023-06-22 DIAGNOSIS — Z01.818 ENCOUNTER FOR OTHER PREPROCEDURAL EXAMINATION: ICD-10-CM

## 2023-06-22 DIAGNOSIS — Z86.39 PERSONAL HISTORY OF OTHER ENDOCRINE, NUTRITIONAL AND METABOLIC DISEASE: ICD-10-CM

## 2023-06-22 DIAGNOSIS — C73 MALIGNANT NEOPLASM OF THYROID GLAND: ICD-10-CM

## 2023-06-22 PROCEDURE — 36415 COLL VENOUS BLD VENIPUNCTURE: CPT

## 2023-06-22 PROCEDURE — G0463: CPT

## 2023-06-22 NOTE — H&P PST ADULT - NSICDXFAMILYHX_GEN_ALL_CORE_FT
FAMILY HISTORY:  Father  Still living? Unknown  FH: pancreatic cancer, Age at diagnosis: Age Unknown    Sibling  Still living? Unknown  FH: thyroid cancer, Age at diagnosis: Age Unknown

## 2023-06-22 NOTE — H&P PST ADULT - NSICDXPROCEDURE_GEN_ALL_CORE_FT
PROCEDURES:  Left lobe thyroidectomy 22-Jun-2023 08:35:01 with paratrachea node dissection Emilie Raman

## 2023-06-22 NOTE — H&P PST ADULT - PROBLEM SELECTOR PLAN 1
Pre op and chlorhexidine instructions given and explained.  patient with concerns of chlorhexidine advised to use dial soap   Avoid NSAIDs and OTC supplements.   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

## 2023-06-22 NOTE — H&P PST ADULT - NSICDXPASTMEDICALHX_GEN_ALL_CORE_FT
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

## 2023-06-22 NOTE — H&P PST ADULT - NSANTHOSAYNRD_GEN_A_CORE
Color consistent with ethnicity/race, warm, dry intact, resilient.
No. KIKI screening performed.  STOP BANG Legend: 0-2 = LOW Risk; 3-4 = INTERMEDIATE Risk; 5-8 = HIGH Risk

## 2023-06-22 NOTE — H&P PST ADULT - HISTORY OF PRESENT ILLNESS
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 Due to increase family hx of thyroid cancer, continuos monitor occurred. During recent follow up 5/2023 FNB done due to abnormal u/s dx with thyroid cancer.

## 2023-06-22 NOTE — H&P PST ADULT - ASSESSMENT
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 Due to increase family hx of thyroid cancer, continuos monitor occurred. During recent follow up 5/2023 FNB done due to abnormal u/s dx with thyroid cancer.

## 2023-06-23 RX ORDER — FLUOXETINE HCL 10 MG
1 CAPSULE ORAL
Refills: 0 | DISCHARGE

## 2023-06-23 NOTE — ASU PATIENT PROFILE, ADULT - NSICDXPASTMEDICALHX_GEN_ALL_CORE_FT
PAST MEDICAL HISTORY:  Anxiety Fluoxetine 5mg QD    Breastfeeding (infant)     History of congenital adrenal hyperplasia     Malignant neoplasm of thyroid gland     Pseudocholinesterase deficiency carrier    Psychogenic vaginismus resolved

## 2023-06-25 ENCOUNTER — TRANSCRIPTION ENCOUNTER (OUTPATIENT)
Age: 28
End: 2023-06-25

## 2023-06-26 ENCOUNTER — TRANSCRIPTION ENCOUNTER (OUTPATIENT)
Age: 28
End: 2023-06-26

## 2023-06-26 ENCOUNTER — RESULT REVIEW (OUTPATIENT)
Age: 28
End: 2023-06-26

## 2023-06-26 ENCOUNTER — APPOINTMENT (OUTPATIENT)
Dept: SURGERY | Facility: HOSPITAL | Age: 28
End: 2023-06-26
Payer: COMMERCIAL

## 2023-06-26 ENCOUNTER — OUTPATIENT (OUTPATIENT)
Dept: OUTPATIENT SERVICES | Facility: HOSPITAL | Age: 28
LOS: 1 days | End: 2023-06-26
Payer: COMMERCIAL

## 2023-06-26 VITALS
OXYGEN SATURATION: 100 % | HEART RATE: 89 BPM | TEMPERATURE: 100 F | SYSTOLIC BLOOD PRESSURE: 121 MMHG | RESPIRATION RATE: 14 BRPM | DIASTOLIC BLOOD PRESSURE: 81 MMHG

## 2023-06-26 VITALS
OXYGEN SATURATION: 97 % | SYSTOLIC BLOOD PRESSURE: 107 MMHG | HEART RATE: 60 BPM | HEIGHT: 61 IN | RESPIRATION RATE: 15 BRPM | WEIGHT: 162.7 LBS | TEMPERATURE: 98 F | DIASTOLIC BLOOD PRESSURE: 72 MMHG

## 2023-06-26 DIAGNOSIS — C73 MALIGNANT NEOPLASM OF THYROID GLAND: ICD-10-CM

## 2023-06-26 DIAGNOSIS — Z98.890 OTHER SPECIFIED POSTPROCEDURAL STATES: Chronic | ICD-10-CM

## 2023-06-26 PROCEDURE — 88307 TISSUE EXAM BY PATHOLOGIST: CPT

## 2023-06-26 PROCEDURE — 60252 REMOVAL OF THYROID: CPT

## 2023-06-26 PROCEDURE — 60240 REMOVAL OF THYROID: CPT | Mod: AS

## 2023-06-26 PROCEDURE — 88307 TISSUE EXAM BY PATHOLOGIST: CPT | Mod: 26

## 2023-06-26 PROCEDURE — 13132 CMPLX RPR F/C/C/M/N/AX/G/H/F: CPT | Mod: 59

## 2023-06-26 PROCEDURE — C1889: CPT

## 2023-06-26 DEVICE — LIGATING CLIPS WECK HORIZON SMALL (YELLOW) 24: Type: IMPLANTABLE DEVICE | Site: LEFT | Status: FUNCTIONAL

## 2023-06-26 DEVICE — LIGATING CLIPS WECK HORIZON MEDIUM (BLUE) 24: Type: IMPLANTABLE DEVICE | Site: LEFT | Status: FUNCTIONAL

## 2023-06-26 RX ORDER — ONDANSETRON 8 MG/1
4 TABLET, FILM COATED ORAL ONCE
Refills: 0 | Status: DISCONTINUED | OUTPATIENT
Start: 2023-06-26 | End: 2023-06-26

## 2023-06-26 RX ORDER — ACETAMINOPHEN 500 MG
650 TABLET ORAL ONCE
Refills: 0 | Status: COMPLETED | OUTPATIENT
Start: 2023-06-26 | End: 2023-06-26

## 2023-06-26 RX ORDER — HYDROCORTISONE 20 MG
1 TABLET ORAL
Refills: 0 | DISCHARGE

## 2023-06-26 RX ORDER — HYDROCORTISONE 20 MG
0 TABLET ORAL
Qty: 0 | Refills: 0 | DISCHARGE

## 2023-06-26 RX ORDER — HYDROCORTISONE 20 MG
1 TABLET ORAL
Qty: 0 | Refills: 0 | DISCHARGE

## 2023-06-26 RX ORDER — FLUOXETINE HCL 10 MG
1 CAPSULE ORAL
Refills: 0 | DISCHARGE

## 2023-06-26 RX ORDER — HYDROCODONE BITARTRATE 50 MG/1
1 CAPSULE, EXTENDED RELEASE ORAL
Qty: 0 | Refills: 0 | DISCHARGE

## 2023-06-26 RX ORDER — BENZOCAINE AND MENTHOL 5; 1 G/100ML; G/100ML
1 LIQUID ORAL
Refills: 0 | Status: DISCONTINUED | OUTPATIENT
Start: 2023-06-26 | End: 2023-07-10

## 2023-06-26 RX ORDER — HYDROMORPHONE HYDROCHLORIDE 2 MG/ML
1 INJECTION INTRAMUSCULAR; INTRAVENOUS; SUBCUTANEOUS
Refills: 0 | Status: DISCONTINUED | OUTPATIENT
Start: 2023-06-26 | End: 2023-06-26

## 2023-06-26 RX ORDER — SODIUM CHLORIDE 9 MG/ML
1000 INJECTION, SOLUTION INTRAVENOUS
Refills: 0 | Status: DISCONTINUED | OUTPATIENT
Start: 2023-06-26 | End: 2023-06-26

## 2023-06-26 RX ORDER — HYDROMORPHONE HYDROCHLORIDE 2 MG/ML
0.5 INJECTION INTRAMUSCULAR; INTRAVENOUS; SUBCUTANEOUS
Refills: 0 | Status: DISCONTINUED | OUTPATIENT
Start: 2023-06-26 | End: 2023-06-26

## 2023-06-26 RX ORDER — ACETAMINOPHEN 500 MG
2 TABLET ORAL
Refills: 0 | DISCHARGE

## 2023-06-26 RX ORDER — HYDROCORTISONE 20 MG
25 TABLET ORAL ONCE
Refills: 0 | Status: COMPLETED | OUTPATIENT
Start: 2023-06-26 | End: 2023-06-26

## 2023-06-26 RX ADMIN — HYDROMORPHONE HYDROCHLORIDE 0.5 MILLIGRAM(S): 2 INJECTION INTRAMUSCULAR; INTRAVENOUS; SUBCUTANEOUS at 13:01

## 2023-06-26 RX ADMIN — Medication 650 MILLIGRAM(S): at 16:07

## 2023-06-26 RX ADMIN — Medication 2 TABLET(S): at 14:29

## 2023-06-26 RX ADMIN — BENZOCAINE AND MENTHOL 1 LOZENGE: 5; 1 LIQUID ORAL at 14:42

## 2023-06-26 RX ADMIN — Medication 25 MILLIGRAM(S): at 14:34

## 2023-06-26 RX ADMIN — SODIUM CHLORIDE 50 MILLILITER(S): 9 INJECTION, SOLUTION INTRAVENOUS at 08:13

## 2023-06-26 RX ADMIN — Medication 650 MILLIGRAM(S): at 16:35

## 2023-06-26 RX ADMIN — HYDROMORPHONE HYDROCHLORIDE 0.5 MILLIGRAM(S): 2 INJECTION INTRAMUSCULAR; INTRAVENOUS; SUBCUTANEOUS at 13:12

## 2023-06-26 NOTE — ASU DISCHARGE PLAN (ADULT/PEDIATRIC) - ASU DC SPECIAL INSTRUCTIONSFT
Elevate head of bed and apply ice frequently to decrease swelling and discomfort.  Wear supportive bra at all times x 2 weeks (except when showering) to support neck muscles/incision and promote healthy scar formation.  No NSAIDs (Advil, Motrin, Ibuprofen, or aspirin) unless approved byt Dr. Thorne.  Follow-up Dr. Thorne in [place] office on Thursday 6/29/23 @ 8:30am.  Tylenol (500mg) 2 tabs by mouth every 8 hours as needed for discomfort.  Oscal with Vit D (500mg/200 int units) 2 tabs by mouth three times daily. Elevate head of bed and apply ice frequently to decrease swelling and discomfort.  Wear supportive bra at all times x 2 weeks (except when showering) to support neck muscles/incision and promote healthy scar formation.  No NSAIDs (Advil, Motrin, Ibuprofen, or aspirin) unless approved byt Dr. Thorne.  Follow-up Dr. Thorne in [place] office on Thursday 6/29/23 @ 8:00am.  Tylenol (500mg) 2 tabs by mouth every 8 hours as needed for discomfort.  Oscal with Vit D (500mg/200 int units) 2 tabs by mouth three times daily.

## 2023-06-26 NOTE — ASU DISCHARGE PLAN (ADULT/PEDIATRIC) - NS MD DC FALL RISK RISK
For information on Fall & Injury Prevention, visit: [email or web address] OR  [email or web address] OR  https://www.cdc.gov/steadi/patient.html

## 2023-06-26 NOTE — ASU DISCHARGE PLAN (ADULT/PEDIATRIC) - CARE PROVIDER_API CALL
Amanda Thorne [facility]  [address] [place] [address], [facility] [address]  [place] [address]  Phone: [phone]  Fax: [phone]  Follow Up Time:

## 2023-06-27 PROBLEM — E88.09 OTHER DISORDERS OF PLASMA-PROTEIN METABOLISM, NOT ELSEWHERE CLASSIFIED: Chronic | Status: ACTIVE | Noted: 2023-06-22

## 2023-06-27 PROBLEM — C73 MALIGNANT NEOPLASM OF THYROID GLAND: Chronic | Status: ACTIVE | Noted: 2023-06-22

## 2023-06-27 PROBLEM — Z86.39 PERSONAL HISTORY OF OTHER ENDOCRINE, NUTRITIONAL AND METABOLIC DISEASE: Chronic | Status: ACTIVE | Noted: 2023-06-22

## 2023-06-27 PROBLEM — Z78.9 OTHER SPECIFIED HEALTH STATUS: Chronic | Status: ACTIVE | Noted: 2023-06-23

## 2023-06-29 ENCOUNTER — APPOINTMENT (OUTPATIENT)
Dept: SURGERY | Facility: CLINIC | Age: 28
End: 2023-06-29
Payer: COMMERCIAL

## 2023-06-29 DIAGNOSIS — C73 MALIGNANT NEOPLASM OF THYROID GLAND: ICD-10-CM

## 2023-06-29 PROCEDURE — 99024 POSTOP FOLLOW-UP VISIT: CPT

## 2023-06-29 NOTE — ASSESSMENT
[FreeTextEntry1] : Patient with newly diagnosed papillary thyroid cancer and a multinodular goiter.  Doing well status post total thyroidectomy.  Patient scheduled to see Dr. Clarke next week.  Is moving to [place].  Importance of follow-up in 4 months emphasized.  Treatment will be based on results of pathology when available.  Malia questions to the best my ability.

## 2023-06-29 NOTE — PHYSICAL EXAM
[de-identified] : Dressing intact, no swelling or erythema.  No cervical or supraclavicular adenopathy, trachea midline, no palpable mass [Normal] : no neck adenopathy [de-identified] : Skin:  normal appearance.  no rash, nodules, vesicles, or erythema,\par Musculoskeletal:  full range of motion and no deformities appreciated\par Neurological:  grossly intact\par Psychiatric:  oriented to person, place and time with appropriate affect

## 2023-06-29 NOTE — HISTORY OF PRESENT ILLNESS
[de-identified] : Patient referred by Dr. Clarke for evaluation of newly diagnosed papillary thyroid cancer.  Patient reports a several year history of thyroid nodules.  Reports family history of papillary thyroid cancer in 2 of her sisters, additional 2 sisters with thyroid nodules with benign biopsies.  Patient denies dysphagia, change in voice or radiation exposure.  Thyroid ultrasound January 2023: Right lobe 5.8 x 1.9 x 1.8 cm with 9 mm mid nodule not previously noted, TR 4, right lower 1.2 x 0.7 x 1.2 cm increased from prior study.  Biopsy benign.  Left lobe 5.3 x 1.1 x 1.7 cm with lower nodule measuring 10 x 7 x 9 mm increased from prior study, TR 4.  Biopsy positive for papillary thyroid cancer.  Stable 6 mm isthmus nodule.  Blood work: Calcium 9.6, TSH 1.4, free T4 1.1.\par 6/26/23 total thyroidecotmy with CND.  Patient denies dysphagia, hoarseness, pain or parathesias.\par   I have reviewed all old and new data and available images.  Additional information was obtained from others present at the time of the visit to ensure the completeness of the history.\par

## 2023-07-06 LAB — SURGICAL PATHOLOGY STUDY: SIGNIFICANT CHANGE UP

## 2023-07-07 ENCOUNTER — NON-APPOINTMENT (OUTPATIENT)
Age: 28
End: 2023-07-07

## 2023-07-13 PROBLEM — F52.5: Chronic | Status: ACTIVE | Noted: 2022-11-13

## 2023-09-07 ENCOUNTER — RX RENEWAL (OUTPATIENT)
Age: 28
End: 2023-09-07

## 2023-09-07 RX ORDER — FLUOXETINE HYDROCHLORIDE 10 MG/1
10 CAPSULE ORAL
Qty: 90 | Refills: 0 | Status: ACTIVE | COMMUNITY
Start: 2019-12-05 | End: 1900-01-01

## 2023-10-03 ENCOUNTER — CLINICAL SUPPORT (OUTPATIENT)
Dept: PEDIATRICS | Facility: CLINIC | Age: 28
End: 2023-10-03
Payer: COMMERCIAL

## 2023-10-03 DIAGNOSIS — Z23 NEED FOR INFLUENZA VACCINATION: ICD-10-CM

## 2023-10-03 PROCEDURE — 90471 IMMUNIZATION ADMIN: CPT | Performed by: PEDIATRICS

## 2023-10-03 PROCEDURE — 90686 IIV4 VACC NO PRSV 0.5 ML IM: CPT | Performed by: PEDIATRICS

## 2023-10-03 NOTE — PROGRESS NOTES
Has the patient already received the influenza vaccine this season?  NO    Is the patient LESS than 6 months in age?  NO    Does the patient have an allergy to the influenza vaccine?  NO    Has the patient received a solid organ transplant in the past 3 months?  NO    Has the patient had anaphylaxis to gelatin or eggs?  NO    Does the patient have an allergy to Gentamicin?  NO    Has the patient been diagnosed with Guillain-[place] within 6 weeks after a previous flu vaccine?  NO

## 2023-11-16 ENCOUNTER — OFFICE VISIT (OUTPATIENT)
Dept: SURGERY | Facility: CLINIC | Age: 28
End: 2023-11-16
Payer: COMMERCIAL

## 2023-11-16 ENCOUNTER — LAB (OUTPATIENT)
Dept: LAB | Facility: LAB | Age: 28
End: 2023-11-16
Payer: COMMERCIAL

## 2023-11-16 VITALS
HEART RATE: 69 BPM | BODY MASS INDEX: 30.58 KG/M2 | SYSTOLIC BLOOD PRESSURE: 131 MMHG | WEIGHT: 162 LBS | HEIGHT: 61 IN | DIASTOLIC BLOOD PRESSURE: 74 MMHG

## 2023-11-16 DIAGNOSIS — C73 THYROID CANCER (MULTI): ICD-10-CM

## 2023-11-16 DIAGNOSIS — E89.0 S/P TOTAL THYROIDECTOMY: ICD-10-CM

## 2023-11-16 DIAGNOSIS — C73 THYROID CANCER (MULTI): Primary | ICD-10-CM

## 2023-11-16 LAB
T4 FREE SERPL-MCNC: 1.17 NG/DL (ref 0.78–1.48)
TSH SERPL-ACNC: 3.94 MIU/L (ref 0.44–3.98)

## 2023-11-16 PROCEDURE — 86800 THYROGLOBULIN ANTIBODY: CPT

## 2023-11-16 PROCEDURE — 84443 ASSAY THYROID STIM HORMONE: CPT

## 2023-11-16 PROCEDURE — 36415 COLL VENOUS BLD VENIPUNCTURE: CPT

## 2023-11-16 PROCEDURE — 99214 OFFICE O/P EST MOD 30 MIN: CPT | Performed by: SURGERY

## 2023-11-16 PROCEDURE — 99204 OFFICE O/P NEW MOD 45 MIN: CPT | Performed by: SURGERY

## 2023-11-16 PROCEDURE — 84439 ASSAY OF FREE THYROXINE: CPT

## 2023-11-16 PROCEDURE — 84432 ASSAY OF THYROGLOBULIN: CPT

## 2023-11-16 RX ORDER — HYDROCORTISONE 5 MG/1
10 TABLET ORAL 2 TIMES DAILY
COMMUNITY
Start: 2023-09-15

## 2023-11-16 RX ORDER — LEVOTHYROXINE SODIUM 112 UG/1
112 TABLET ORAL DAILY
COMMUNITY
Start: 2023-09-20

## 2023-11-16 ASSESSMENT — ENCOUNTER SYMPTOMS
EYES NEGATIVE: 1
CONSTITUTIONAL NEGATIVE: 1
NEUROLOGICAL NEGATIVE: 1
PSYCHIATRIC NEGATIVE: 1
CARDIOVASCULAR NEGATIVE: 1
MUSCULOSKELETAL NEGATIVE: 1
GASTROINTESTINAL NEGATIVE: 1
RESPIRATORY NEGATIVE: 1
ENDOCRINE NEGATIVE: 1

## 2023-11-16 NOTE — PROGRESS NOTES
Subjective   Patient ID: Queta Bob is a 28 y.o. female who presents for follow-up of papillary thyroid cancer.    HPI I saw Mrs. Bob in surgery clinic today.  She is a pleasant 28-year-old woman who was diagnosed with a papillary thyroid cancer in [place].  She underwent a total thyroidectomy.  I do not have final pathology on the actual tumor itself.    She is currently on levothyroxine at 112 mcg/day.  I do not have any recent laboratory information on her.  She is clinically euthyroid.    She does not yet have an endocrinologist.  Her surgeon told her that they would recommend she at least follow-up with the surgeon 1 time to check the incision for her.  She has not had any problems.  Wound is healed nicely.  No difficulties breathing or swallowing.  No anterior cervical neck pain.  Her voice is normal.    Family history-she said one of her sisters had papillary thyroid cancer.  She has had another sister had a different type of thyroid cancer but she is not exactly sure what.    Review of Systems   Constitutional: Negative.    HENT: Negative.     Eyes: Negative.    Respiratory: Negative.     Cardiovascular: Negative.    Gastrointestinal: Negative.    Endocrine: Negative.    Musculoskeletal: Negative.    Skin: Negative.    Neurological: Negative.    Psychiatric/Behavioral: Negative.         Objective   Physical Exam  Vitals reviewed.   Constitutional:       Appearance: Normal appearance.      Comments: Her voice is normal   Eyes:      Comments: No proptosis   Neck:      Comments: Neck incision healed well.  Thyroid surgically absent.  Trachea midline.  Cardiovascular:      Rate and Rhythm: Normal rate and regular rhythm.      Pulses: Normal pulses.      Heart sounds: Normal heart sounds.   Pulmonary:      Effort: Pulmonary effort is normal.      Breath sounds: Normal breath sounds.   Musculoskeletal:         General: Normal range of motion.   Lymphadenopathy:      Cervical: No cervical adenopathy.    Skin:     General: Skin is warm and dry.   Neurological:      General: No focal deficit present.      Mental Status: She is alert and oriented to person, place, and time.   Psychiatric:         Mood and Affect: Mood normal.         Behavior: Behavior normal.         Assessment/Plan    Mrs. Bob is now just about 5 months status post total thyroidectomy for a papillary thyroid cancer per her report.  This was done at the [facility] in [place].  She had some records that included her preoperative physical examination and ultrasound.  However, we did not receive any copies of the actual final pathology.  Her ultrasound-guided biopsy of the left-sided thyroid nodule was positive for papillary thyroid cancer.  This is cytology information only.    Patient did describe that she had positive central neck lymph nodes and did undergo a central neck lymph node dissection.  Again I have no copy of the surgery report or the pathology on this.    Her wounds are healing well she has had no postoperative issues.  At this point she really needs to meet with an endocrinologist.    I will place a referral for her to medical endocrinology.    I will also go ahead and order a TSH free T4 thyroglobulin and antibody levels so there is some baseline laboratory information for medical endocrinology once they see her.    Ultimately she will likely need radioactive iodine ablation which would also be handled by medical endocrinology.

## 2023-11-18 LAB
BILL ONLY-THYROGLOBULIN: NORMAL
THYROGLOB AB SERPL-ACNC: <0.9 IU/ML (ref 0–4)
THYROGLOB SERPL-MCNC: 0.3 NG/ML (ref 1.3–31.8)
THYROGLOB SERPL-MCNC: ABNORMAL NG/ML (ref 1.3–31.8)

## 2023-11-20 ENCOUNTER — TELEPHONE (OUTPATIENT)
Dept: SURGERY | Facility: CLINIC | Age: 28
End: 2023-11-20
Payer: COMMERCIAL

## 2023-11-20 NOTE — TELEPHONE ENCOUNTER
Call to patient. No answer. Left voicemail message notifying patient that Dr. Coto would like her to increase levothyroxine to 125mcg every day until she meets with endocrinologist. Requested callback with preferred pharmacy and date of appointment with endocrinologist. Provided callback number.

## 2023-11-20 NOTE — TELEPHONE ENCOUNTER
Spoke with patient. She would like her endocrinologist in [place] to continue to manage levothyroxine until she sees new endocrinologist in February 2024. Dr. Coto agreeable with plan. Lab results sent to patient per her request.

## 2023-12-15 NOTE — CONSULT LETTER
[Dear  ___] : Dear  [unfilled], [Consult Letter:] : I had the pleasure of evaluating your patient, [unfilled]. [Please see my note below.] : Please see my note below. [Consult Closing:] : Thank you very much for allowing me to participate in the care of this patient.  If you have any questions, please do not hesitate to contact me. [Sincerely,] : Sincerely, [FreeTextEntry2] : Dr. Karlos Clarke [FreeTextEntry3] : Amanda Thorne MD, FACS\par Assistant Professor of Surgery and Otolaryngology\par [facility]\par  Patient has no objection to blood transfusions.

## 2024-10-01 ENCOUNTER — APPOINTMENT (OUTPATIENT)
Dept: PEDIATRICS | Facility: CLINIC | Age: 29
End: 2024-10-01
Payer: COMMERCIAL

## 2024-10-01 DIAGNOSIS — Z23 ENCOUNTER FOR IMMUNIZATION: ICD-10-CM

## 2024-10-01 PROCEDURE — 90656 IIV3 VACC NO PRSV 0.5 ML IM: CPT | Performed by: PEDIATRICS

## 2024-10-01 PROCEDURE — 90471 IMMUNIZATION ADMIN: CPT | Performed by: PEDIATRICS

## 2024-10-01 NOTE — PROGRESS NOTES
Has the patient already received the influenza vaccine this season?  NO     Is the patient LESS than 6 months in age?  NO     Does the patient have an allergy to the influenza vaccine?  NO     Has the patient received a solid organ transplant in the past 3 months?  NO     Has the patient had anaphylaxis to gelatin or eggs?  NO     Does the patient have an allergy to Gentamicin?  NO     Has the patient been diagnosed with Guillain-[place] within 6 weeks after a previous flu vaccine?  NO

## 2025-04-16 ENCOUNTER — NON-APPOINTMENT (OUTPATIENT)
Age: 30
End: 2025-04-16
